# Patient Record
Sex: FEMALE | Race: WHITE | NOT HISPANIC OR LATINO | Employment: STUDENT | URBAN - METROPOLITAN AREA
[De-identification: names, ages, dates, MRNs, and addresses within clinical notes are randomized per-mention and may not be internally consistent; named-entity substitution may affect disease eponyms.]

---
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
## 2019-05-09 ENCOUNTER — OFFICE VISIT (OUTPATIENT)
Dept: RETAIL CLINIC | Facility: CLINIC | Age: 19
End: 2019-05-09

## 2019-05-09 VITALS
TEMPERATURE: 98.6 F | OXYGEN SATURATION: 99 % | DIASTOLIC BLOOD PRESSURE: 83 MMHG | SYSTOLIC BLOOD PRESSURE: 104 MMHG | HEART RATE: 71 BPM

## 2019-05-09 DIAGNOSIS — H66.002 ACUTE SUPPURATIVE OTITIS MEDIA OF LEFT EAR WITHOUT SPONTANEOUS RUPTURE OF TYMPANIC MEMBRANE, RECURRENCE NOT SPECIFIED: Primary | ICD-10-CM

## 2019-05-09 PROCEDURE — 99213 OFFICE O/P EST LOW 20 MIN: CPT | Performed by: NURSE PRACTITIONER

## 2019-05-09 RX ORDER — NORGESTIMATE AND ETHINYL ESTRADIOL 7DAYSX3 28
KIT ORAL
COMMUNITY

## 2019-05-09 RX ORDER — CEFDINIR 300 MG/1
300 CAPSULE ORAL 2 TIMES DAILY
Qty: 20 CAPSULE | Refills: 0 | Status: SHIPPED | OUTPATIENT
Start: 2019-05-09 | End: 2019-05-19

## 2019-05-09 RX ORDER — RANITIDINE 150 MG/1
150 TABLET ORAL
COMMUNITY
Start: 2019-04-11 | End: 2019-07-24 | Stop reason: SDUPTHER

## 2019-05-09 NOTE — PROGRESS NOTES
Subjective     Liza Casillas is a 18 y.o.. female.     Pt dx with mono. One week ago; last fever was on Thursday      Earache    There is pain in the left ear. This is a new problem. The current episode started today. The problem has been unchanged. Associated symptoms include abdominal pain, coughing (since January) and headaches. Pertinent negatives include no diarrhea, rhinorrhea, sore throat or vomiting. She has tried nothing for the symptoms. The treatment provided no relief.       The following portions of the patient's history were reviewed and updated as appropriate: allergies, current medications, past medical history, past social history and past surgical history.    Review of Systems   Constitutional: Negative for fever.   HENT: Positive for congestion and ear pain (left). Negative for rhinorrhea and sore throat.    Respiratory: Positive for cough (since January).    Gastrointestinal: Positive for abdominal pain and nausea. Negative for diarrhea and vomiting.   Neurological: Positive for headaches.       Objective     Vitals:    05/09/19 1136   BP: 104/83   Pulse: 71   Temp: 98.6 °F (37 °C)   TempSrc: Oral   SpO2: 99%       Physical Exam   Constitutional: She is oriented to person, place, and time. She appears well-developed and well-nourished.   HENT:   Head: Normocephalic and atraumatic.   Right Ear: Tympanic membrane is not erythematous. A middle ear effusion (clear) is present.   Left Ear: Tympanic membrane is erythematous. A middle ear effusion (clear with beginning haziness to left half of TM) is present.   Nose: Nose normal. Right sinus exhibits no maxillary sinus tenderness and no frontal sinus tenderness. Left sinus exhibits no maxillary sinus tenderness and no frontal sinus tenderness.   Mouth/Throat: Oropharynx is clear and moist. Oropharyngeal exudate: pnd.   Eyes: Conjunctivae are normal. Pupils are equal, round, and reactive to light.   Cardiovascular: Normal rate and regular rhythm.   No  murmur heard.  Pulmonary/Chest: Effort normal. She has no wheezes. She has no rhonchi. She has no rales.   Musculoskeletal: Normal range of motion.   Lymphadenopathy:     She has no cervical adenopathy.   Neurological: She is alert and oriented to person, place, and time.   Skin: Skin is warm and dry.   Vitals reviewed.      Assessment/Plan   Liza was seen today for earache.    Diagnoses and all orders for this visit:    Acute suppurative otitis media of left ear without spontaneous rupture of tympanic membrane, recurrence not specified  -     cefdinir (OMNICEF) 300 MG capsule; Take 1 capsule by mouth 2 (Two) Times a Day for 10 days.        Patient Instructions   Otitis Media, Adult  Otitis media occurs when there is inflammation and fluid in the middle ear. Your middle ear is a part of the ear that contains bones for hearing as well as air that helps send sounds to your brain.  What are the causes?  This condition is caused by a blockage in the eustachian tube. This tube drains fluid from the ear to the back of the nose (nasopharynx). A blockage in this tube can be caused by an object or by swelling (edema) in the tube. Problems that can cause a blockage include:  · A cold or other upper respiratory infection.  · Allergies.  · An irritant, such as tobacco smoke.  · Enlarged adenoids. The adenoids are areas of soft tissue located high in the back of the throat, behind the nose and the roof of the mouth.  · A mass in the nasopharynx.  · Damage to the ear caused by pressure changes (barotrauma).    What are the signs or symptoms?  Symptoms of this condition include:  · Ear pain.  · A fever.  · Decreased hearing.  · A headache.  · Tiredness (lethargy).  · Fluid leaking from the ear.  · Ringing in the ear.    How is this diagnosed?  This condition is diagnosed with a physical exam. During the exam your health care provider will use an instrument called an otoscope to look into your ear and check for redness, swelling,  and fluid. He or she will also ask about your symptoms.  Your health care provider may also order tests, such as:  · A test to check the movement of the eardrum (pneumatic otoscopy). This test is done by squeezing a small amount of air into the ear.  · A test that changes air pressure in the middle ear to check how well the eardrum moves and whether the eustachian tube is working (tympanogram).    How is this treated?  This condition usually goes away on its own within 3-5 days. But if the condition is caused by a bacteria infection and does not go away own its own, or keeps coming back, your health care provider may:  · Prescribe antibiotic medicines to treat the infection.  · Prescribe or recommend medicines to control pain.    Follow these instructions at home:  · Take over-the-counter and prescription medicines only as told by your health care provider.  · If you were prescribed an antibiotic medicine, take it as told by your health care provider. Do not stop taking the antibiotic even if you start to feel better.  · Keep all follow-up visits as told by your health care provider. This is important.  Contact a health care provider if:  · You have bleeding from your nose.  · There is a lump on your neck.  · You are not getting better in 5 days.  · You feel worse instead of better.  Get help right away if:  · You have severe pain that is not controlled with medicine.  · You have swelling, redness, or pain around your ear.  · You have stiffness in your neck.  · A part of your face is paralyzed.  · The bone behind your ear (mastoid) is tender when you touch it.  · You develop a severe headache.  Summary  · Otitis media is redness, soreness, and swelling of the middle ear.  · This condition usually goes away on its own within 3-5 days.  · If the problem does not go away in 3-5 days, your health care provider may prescribe or recommend medicines to treat your symptoms.  · If you were prescribed an antibiotic medicine,  take it as told by your health care provider.  This information is not intended to replace advice given to you by your health care provider. Make sure you discuss any questions you have with your health care provider.  Document Released: 09/22/2005 Document Revised: 12/08/2017 Document Reviewed: 12/08/2017  [employer] Interactive Patient Education © 2019 [employer]        Return if symptoms worsen or fail to improve, for follow up with primary care provider as needed.

## 2019-05-09 NOTE — PATIENT INSTRUCTIONS

## 2019-06-03 ENCOUNTER — OFFICE VISIT (OUTPATIENT)
Dept: INTERNAL MEDICINE | Facility: CLINIC | Age: 19
End: 2019-06-03

## 2019-06-03 VITALS
DIASTOLIC BLOOD PRESSURE: 60 MMHG | RESPIRATION RATE: 16 BRPM | HEART RATE: 64 BPM | WEIGHT: 147 LBS | SYSTOLIC BLOOD PRESSURE: 90 MMHG | OXYGEN SATURATION: 97 % | BODY MASS INDEX: 22.28 KG/M2 | HEIGHT: 68 IN | TEMPERATURE: 99.1 F

## 2019-06-03 DIAGNOSIS — K29.50 CHRONIC GASTRITIS WITHOUT BLEEDING, UNSPECIFIED GASTRITIS TYPE: Primary | ICD-10-CM

## 2019-06-03 DIAGNOSIS — Z23 NEED FOR TDAP VACCINATION: ICD-10-CM

## 2019-06-03 PROCEDURE — 99213 OFFICE O/P EST LOW 20 MIN: CPT | Performed by: FAMILY MEDICINE

## 2019-06-03 PROCEDURE — 90715 TDAP VACCINE 7 YRS/> IM: CPT | Performed by: FAMILY MEDICINE

## 2019-06-03 PROCEDURE — 90471 IMMUNIZATION ADMIN: CPT | Performed by: FAMILY MEDICINE

## 2019-06-03 NOTE — PROGRESS NOTES
Liza Casillas is a 18 y.o. female, who presents with a chief complaint of   Chief Complaint   Patient presents with   • Nausea     Has gastritis   • Establish Care     [facility] prior-thinks just due for TDAP       HPI     Pt presents to establish care.    1. Gastritis.  She had chronic nausea and vomiting and was diagnosed with gastritis and started on ranitidine 4 months ago.  She is feeling much better.  She takes occasional ondansetron as needed.      The following portions of the patient's history were reviewed and updated as appropriate: allergies, current medications, past family history, past medical history, past social history, past surgical history and problem list.    Allergies: Sulfa antibiotics    Review of Systems   Constitutional: Negative.    HENT: Negative.    Eyes: Negative.    Respiratory: Negative.    Cardiovascular: Negative.    Gastrointestinal: Positive for nausea.   Endocrine: Negative.    Genitourinary: Negative.    Musculoskeletal: Negative.    Skin: Negative.    Allergic/Immunologic: Negative.    Neurological: Negative.    Hematological: Negative.    Psychiatric/Behavioral: Negative.              Wt Readings from Last 3 Encounters:   06/03/19 66.7 kg (147 lb) (78 %, Z= 0.76)*   03/26/17 66.7 kg (147 lb) (83 %, Z= 0.96)*   10/23/16 64.9 kg (143 lb) (81 %, Z= 0.87)*     * Growth percentiles are based on CDC (Girls, 2-20 Years) data.     Temp Readings from Last 3 Encounters:   06/03/19 99.1 °F (37.3 °C) (Oral)   05/09/19 98.6 °F (37 °C) (Oral)   03/26/17 99.3 °F (37.4 °C)     BP Readings from Last 3 Encounters:   06/03/19 90/60   05/09/19 104/83   03/26/17 108/64     Pulse Readings from Last 3 Encounters:   06/03/19 64   05/09/19 71   03/26/17 (!) 132     Body mass index is 22.35 kg/m².  @LASTSAO2(3)@    Physical Exam   Constitutional: She is oriented to person, place, and time. She appears well-developed and well-nourished. No distress.   HENT:   Head: Normocephalic and  atraumatic.   Eyes: Conjunctivae and EOM are normal.   Neck: Neck supple. No thyromegaly present.   Cardiovascular: Normal rate, regular rhythm and normal heart sounds.   Pulmonary/Chest: Effort normal and breath sounds normal.   Abdominal: Soft. She exhibits no distension. There is no tenderness.   Musculoskeletal: Normal range of motion. She exhibits no edema.   Neurological: She is alert and oriented to person, place, and time.   Skin: Skin is warm and dry.   Psychiatric: She has a normal mood and affect. Her behavior is normal.   Nursing note and vitals reviewed.          Liza was seen today for nausea and establish care.    Diagnoses and all orders for this visit:    Chronic gastritis without bleeding, unspecified gastritis type    Well-controlled with ranitidine and ondansetron.    Tdap today.      Outpatient Medications Prior to Visit   Medication Sig Dispense Refill   • norgestimate-ethinyl estradiol (ORTHO TRI-CYCLEN,TRINESSA) 0.18/0.215/0.25 MG-35 MCG per tablet Take  by mouth.     • ondansetron (ZOFRAN) 4 MG tablet Take 1 tablet by mouth Every 12 (Twelve) Hours As Needed for Nausea or Vomiting. 10 tablet 0   • Probiotic Product (PROBIOTIC PO) Take  by mouth.     • raNITIdine (ZANTAC) 150 MG tablet Take 150 mg by mouth.     • ISOtretinoin (ACCUTANE PO) Take  by mouth.     • NON FORMULARY Take 1 tablet by mouth Daily. Birth control pill       No facility-administered medications prior to visit.      No orders of the defined types were placed in this encounter.    [unfilled]  Medications Discontinued During This Encounter   Medication Reason   • NON FORMULARY *Error   • ISOtretinoin (ACCUTANE PO) *Therapy completed         Return if symptoms worsen or fail to improve.

## 2019-07-24 RX ORDER — RANITIDINE 150 MG/1
150 TABLET ORAL DAILY
Qty: 90 TABLET | Refills: 1 | Status: SHIPPED | OUTPATIENT
Start: 2019-07-24 | End: 2020-01-08

## 2019-07-24 RX ORDER — ONDANSETRON 4 MG/1
4 TABLET, FILM COATED ORAL EVERY 12 HOURS PRN
Qty: 30 TABLET | Refills: 5 | Status: SHIPPED | OUTPATIENT
Start: 2019-07-24 | End: 2019-11-06 | Stop reason: SDUPTHER

## 2019-08-19 ENCOUNTER — OFFICE VISIT (OUTPATIENT)
Dept: INTERNAL MEDICINE | Facility: CLINIC | Age: 19
End: 2019-08-19

## 2019-08-19 VITALS
RESPIRATION RATE: 16 BRPM | BODY MASS INDEX: 21.98 KG/M2 | TEMPERATURE: 99.3 F | OXYGEN SATURATION: 99 % | SYSTOLIC BLOOD PRESSURE: 114 MMHG | HEART RATE: 96 BPM | HEIGHT: 68 IN | WEIGHT: 145 LBS | DIASTOLIC BLOOD PRESSURE: 60 MMHG

## 2019-08-19 DIAGNOSIS — J30.9 ALLERGIC RHINITIS, UNSPECIFIED SEASONALITY, UNSPECIFIED TRIGGER: Primary | ICD-10-CM

## 2019-08-19 DIAGNOSIS — H65.93 MIDDLE EAR EFFUSION, BILATERAL: ICD-10-CM

## 2019-08-19 PROCEDURE — 99213 OFFICE O/P EST LOW 20 MIN: CPT | Performed by: FAMILY MEDICINE

## 2019-08-19 NOTE — PROGRESS NOTES
Liza Casillas is a 18 y.o. female, who presents with a chief complaint of   Chief Complaint   Patient presents with   • Otitis Media     LT        HPI     Pt presents with the complaint of decreased hearing and fullness in the left ear.  This has been worsening for several days.  She also has mild symptoms on the right.  She has a history of acute left otitis media 3 months ago and ruptured left TM as a child.  She also c/o nasal congestion, itching, sneezing runny nose for several days.  She denies ear pain, fever, sinus pain, cough.    The following portions of the patient's history were reviewed and updated as appropriate: allergies, current medications, past family history, past medical history, past social history, past surgical history and problem list.    Allergies: Sulfa antibiotics    Review of Systems   Constitutional: Negative for appetite change and fever.   HENT: Positive for congestion, hearing loss (left), postnasal drip, rhinorrhea and sneezing. Negative for ear pain, sinus pressure and sinus pain.    Respiratory: Negative for shortness of breath.    Cardiovascular: Negative.    Allergic/Immunologic: Positive for environmental allergies.             Wt Readings from Last 3 Encounters:   08/19/19 65.8 kg (145 lb) (75 %, Z= 0.68)*   06/03/19 66.7 kg (147 lb) (78 %, Z= 0.76)*   03/26/17 66.7 kg (147 lb) (83 %, Z= 0.96)*     * Growth percentiles are based on CDC (Girls, 2-20 Years) data.     Temp Readings from Last 3 Encounters:   08/19/19 99.3 °F (37.4 °C) (Oral)   06/03/19 99.1 °F (37.3 °C) (Oral)   05/09/19 98.6 °F (37 °C) (Oral)     BP Readings from Last 3 Encounters:   08/19/19 114/60   06/03/19 90/60   05/09/19 104/83     Pulse Readings from Last 3 Encounters:   08/19/19 96   06/03/19 64   05/09/19 71     Body mass index is 22.05 kg/m².  @LASTSAO2(3)@    Physical Exam   Constitutional: She is oriented to person, place, and time. She appears well-developed and well-nourished. No distress.    HENT:   Head: Normocephalic and atraumatic.   Right Ear: A middle ear effusion is present.   Left Ear: A middle ear effusion is present.   Nose: Mucosal edema present.   Mouth/Throat: Oropharynx is clear and moist.   Eyes: Conjunctivae and EOM are normal.   Neck: Neck supple. No thyromegaly present.   Pulmonary/Chest: Effort normal. No respiratory distress.   Musculoskeletal: Normal range of motion. She exhibits no edema.   Neurological: She is alert and oriented to person, place, and time.   Skin: Skin is warm and dry.   Psychiatric: She has a normal mood and affect. Her behavior is normal.   Nursing note and vitals reviewed.            Liza was seen today for otitis media.    Diagnoses and all orders for this visit:    Allergic rhinitis, unspecified seasonality, unspecified trigger    Middle ear effusion, bilateral      Start nasal steroid and antihistamine.  Anticipatory guidance given.        Outpatient Medications Prior to Visit   Medication Sig Dispense Refill   • norgestimate-ethinyl estradiol (ORTHO TRI-CYCLEN,TRINESSA) 0.18/0.215/0.25 MG-35 MCG per tablet Take  by mouth.     • ondansetron (ZOFRAN) 4 MG tablet Take 1 tablet by mouth Every 12 (Twelve) Hours As Needed for Nausea or Vomiting. 30 tablet 5   • Probiotic Product (PROBIOTIC PO) Take  by mouth.     • raNITIdine (ZANTAC) 150 MG tablet Take 1 tablet by mouth Daily. 90 tablet 1     No facility-administered medications prior to visit.      No orders of the defined types were placed in this encounter.    [unfilled]  There are no discontinued medications.      No Follow-up on file.

## 2019-11-04 ENCOUNTER — TELEPHONE (OUTPATIENT)
Dept: INTERNAL MEDICINE | Facility: CLINIC | Age: 19
End: 2019-11-04

## 2019-11-04 NOTE — TELEPHONE ENCOUNTER
PATIENT CALLED NEEDING A REFILL ON ZOFRAN. SHE ALSO QUESTIONED USING RANITIDINE, 150 MG. THERE HAS BEEN ARTICLES ABOUT IT POSSIBLE BEING  CANCER CAUSING. WHAT DOES DR. MCCLAIN THINK?      [facility] [place] [place], [address] [facility].

## 2019-11-06 RX ORDER — ONDANSETRON 4 MG/1
4 TABLET, FILM COATED ORAL EVERY 12 HOURS PRN
Qty: 30 TABLET | Refills: 5 | Status: SHIPPED | OUTPATIENT
Start: 2019-11-06 | End: 2020-10-08 | Stop reason: SDUPTHER

## 2019-11-06 NOTE — TELEPHONE ENCOUNTER
Okay to refill Zofran.  She could switch from ranitidine to either famotidine (Pepcid) or cimetidine (Tagamet) over-the-counter.

## 2020-01-08 ENCOUNTER — OFFICE VISIT (OUTPATIENT)
Dept: INTERNAL MEDICINE | Facility: CLINIC | Age: 20
End: 2020-01-08

## 2020-01-08 VITALS
RESPIRATION RATE: 16 BRPM | HEIGHT: 68 IN | BODY MASS INDEX: 21.37 KG/M2 | DIASTOLIC BLOOD PRESSURE: 68 MMHG | HEART RATE: 81 BPM | TEMPERATURE: 98.5 F | SYSTOLIC BLOOD PRESSURE: 110 MMHG | OXYGEN SATURATION: 98 % | WEIGHT: 141 LBS

## 2020-01-08 DIAGNOSIS — R11.0 CHRONIC NAUSEA: ICD-10-CM

## 2020-01-08 DIAGNOSIS — M54.2 NECK PAIN: Primary | ICD-10-CM

## 2020-01-08 PROCEDURE — 99213 OFFICE O/P EST LOW 20 MIN: CPT | Performed by: FAMILY MEDICINE

## 2020-01-08 RX ORDER — TIZANIDINE 4 MG/1
4 TABLET ORAL NIGHTLY PRN
Qty: 30 TABLET | Refills: 0 | Status: ON HOLD | OUTPATIENT
Start: 2020-01-08 | End: 2020-08-03

## 2020-01-08 RX ORDER — MELOXICAM 15 MG/1
15 TABLET ORAL DAILY
Qty: 30 TABLET | Refills: 0 | Status: ON HOLD | OUTPATIENT
Start: 2020-01-08 | End: 2020-08-03

## 2020-01-08 NOTE — PROGRESS NOTES
Liza Casillas is a 19 y.o. female, who presents with a chief complaint of   Chief Complaint   Patient presents with   • Neck Pain     recurrent since 10/15/19-right sided       HPI     Pt presents with intermittent right-sided neck pain X 3 months.  She woke up one day in October with a neck spasm.  She went to urgent care at the onset and was prescribed muscle relaxants.  She says it feels irritated and stiff off and on since then.  Denies shooting pains down her arm.  Denies numbness/tingling.    Chronic nausea X 5 years.  Intermittent.  She hasn't had a workup done and would like to see a gastroenterologist.  Ondansetron and ranitidine help.    The following portions of the patient's history were reviewed and updated as appropriate: allergies, current medications, past family history, past medical history, past social history, past surgical history and problem list.    Allergies: Sulfa antibiotics    Review of Systems   Constitutional: Negative for fever.   Gastrointestinal: Positive for nausea and vomiting. Negative for abdominal pain.   Musculoskeletal: Positive for neck pain.   Skin: Negative.    Psychiatric/Behavioral: Negative for sleep disturbance.             Wt Readings from Last 3 Encounters:   01/08/20 64 kg (141 lb)   08/19/19 65.8 kg (145 lb) (75 %, Z= 0.68)*   06/03/19 66.7 kg (147 lb) (78 %, Z= 0.76)*     * Growth percentiles are based on CDC (Girls, 2-20 Years) data.     Temp Readings from Last 3 Encounters:   01/08/20 98.5 °F (36.9 °C) (Oral)   08/19/19 99.3 °F (37.4 °C) (Oral)   06/03/19 99.1 °F (37.3 °C) (Oral)     BP Readings from Last 3 Encounters:   01/08/20 110/68   08/19/19 114/60   06/03/19 90/60     Pulse Readings from Last 3 Encounters:   01/08/20 81   08/19/19 96   06/03/19 64     Body mass index is 21.44 kg/m².  @LASTSAO2(3)@    Physical Exam   Constitutional: She is oriented to person, place, and time. She appears well-developed and well-nourished. No distress.   HENT:   Head:  "Normocephalic and atraumatic.   Eyes: Conjunctivae and EOM are normal.   Pulmonary/Chest: Effort normal. No respiratory distress.   Abdominal: Soft. There is no tenderness.   Musculoskeletal:        Cervical back: She exhibits tenderness (mild right muscular). She exhibits normal range of motion, no bony tenderness, no swelling, no deformity, no pain and no spasm.   Neurological: She is alert and oriented to person, place, and time. She displays normal reflexes. She exhibits normal muscle tone. Coordination normal.   Skin: Skin is warm and dry.   Psychiatric: She has a normal mood and affect. Her behavior is normal.   Nursing note and vitals reviewed.      Results for orders placed or performed in visit on 03/26/17   POC Influenza A / B   Result Value Ref Range    Rapid Influenza A Ag positive     Rapid Influenza B Ag negative     Internal Control Passed Passed    Lot Number [id number]     Expiration Date 1,[id number]            Liza was seen today for neck pain.    Diagnoses and all orders for this visit:    Neck pain  -     tiZANidine (ZANAFLEX) 4 MG tablet; Take 1 tablet by mouth At Night As Needed for Muscle Spasms.  -     meloxicam (MOBIC) 15 MG tablet; Take 1 tablet by mouth Daily. Take with food.  -     Ambulatory Referral to Physical Therapy Evaluate and treat    Chronic nausea  -     Ambulatory Referral to Gastroenterology  -     H. Pylori Breath Test - Breath, Lung      No \"red flag\" s/sxs.  Physical therapy.  Nsaid and tizanidine.    Outpatient Medications Prior to Visit   Medication Sig Dispense Refill   • norgestimate-ethinyl estradiol (ORTHO TRI-CYCLEN,TRINESSA) 0.18/0.215/0.25 MG-35 MCG per tablet Take  by mouth.     • ondansetron (ZOFRAN) 4 MG tablet Take 1 tablet by mouth Every 12 (Twelve) Hours As Needed for Nausea or Vomiting. 30 tablet 5   • Probiotic Product (PROBIOTIC PO) Take  by mouth.     • raNITIdine (ZANTAC) 150 MG tablet Take 1 tablet by mouth Daily. 90 tablet 1     No facility-administered " medications prior to visit.      New Medications Ordered This Visit   Medications   • tiZANidine (ZANAFLEX) 4 MG tablet     Sig: Take 1 tablet by mouth At Night As Needed for Muscle Spasms.     Dispense:  30 tablet     Refill:  0   • meloxicam (MOBIC) 15 MG tablet     Sig: Take 1 tablet by mouth Daily. Take with food.     Dispense:  30 tablet     Refill:  0     [unfilled]  Medications Discontinued During This Encounter   Medication Reason   • raNITIdine (ZANTAC) 150 MG tablet *Therapy completed         No follow-ups on file.

## 2020-01-09 LAB — UREA BREATH TEST QL: NEGATIVE

## 2020-01-17 ENCOUNTER — TELEPHONE (OUTPATIENT)
Dept: INTERNAL MEDICINE | Facility: CLINIC | Age: 20
End: 2020-01-17

## 2020-01-17 NOTE — TELEPHONE ENCOUNTER
----- Message from Niko Barrow MD sent at 1/9/2020  5:01 PM EST -----  Please call the patient regarding her result.  H. Pylori test is negative.

## 2020-01-20 ENCOUNTER — HOSPITAL ENCOUNTER (OUTPATIENT)
Dept: PHYSICAL THERAPY | Facility: HOSPITAL | Age: 20
Setting detail: THERAPIES SERIES
Discharge: HOME | End: 2020-01-20

## 2020-01-20 DIAGNOSIS — M54.2 NECK PAIN: Primary | ICD-10-CM

## 2020-01-20 PROCEDURE — 97161 PT EVAL LOW COMPLEX 20 MIN: CPT

## 2020-01-20 NOTE — THERAPY EVALUATION
Outpatient Physical Therapy Ortho Initial Evaluation  [facility]     Patient Name: Liza Casillas  : 2000  MRN: [id number]  Today's Date: 2020      Visit Date: 2020    Patient Active Problem List   Diagnosis   • Neck pain   • Chronic nausea        Past Medical History:   Diagnosis Date   • Gastritis         Past Surgical History:   Procedure Laterality Date   • WISDOM TOOTH EXTRACTION         Visit Dx:     ICD-10-CM ICD-9-CM   1. Neck pain M54.2 723.1         Patient History     Row Name 20 1200             History    Chief Complaint  Muscle tenderness;Muscle weakness;Pain  -AS      Type of Pain  Neck pain  -AS      Brief Description of Current Complaint  Patient states she first started having neck pain in 2019. She states she went to her MD and was given muscle relaxers and felt good for 1 month. She states her pain did return but is now more of a soreness type pain then muscle tightness. She denies numbness or tingling and states her pain is all on the right side of her neck to her right shoulder. She denies having x-rays or MRI at this time. She states she is a  at her college and this tends to increase her pain.  -AS      Patient/Caregiver Goals  Relieve pain;Return to prior level of function  -AS      Patient's Rating of General Health  Excellent  -AS      Hand Dominance  right-handed  -AS      Occupation/sports/leisure activities  Student,   -AS      Patient seeing anyone else for problem(s)?  Dr. Barrow  -AS      How has patient tried to help current problem?  rest, stretches, heat, pain meds  -AS         Pain     Pain Location  Neck  -AS      Pain at Present  4  -AS      Pain at Best  0  -AS      Pain at Worst  6  -AS      Pain Frequency  Intermittent  -AS      Pain Description  Aching  -AS      What Performance Factors Make the Current Problem(s) WORSE?  turning head to right, use of RUE  -AS      What Performance Factors  Make the Current Problem(s) BETTER?  rest  -AS         Daily Activities    Primary Language  English  -AS      Are you able to read  Yes  -AS      Are you able to write  Yes  -AS      How does patient learn best?  Listening;Reading  -AS      Teaching needs identified  Home Exercise Program;Management of Condition  -AS      Patient is concerned about/has problems with  Flexibility;Performing job responsibilities/community activities (work, school,;Performing sports, recreation, and play activities;Reaching over head;Repetitive movements of the hand, arm, shoulder  -AS      Does patient have problems with the following?  Anxiety  -AS      Barriers to learning  None  -AS      Pt Participated in POC and Goals  Yes  -AS         Safety    Are you being hurt, hit, or frightened by anyone at home or in your life?  No  -AS      Are you being neglected by a caregiver  No  -AS        User Key  (r) = Recorded By, (t) = Taken By, (c) = Cosigned By    Initials Name Provider Type    AS Shashank Miller, PT Physical Therapist          PT Ortho     Row Name 01/20/20 1200       Precautions and Contraindications    Precautions/Limitations  no known precautions/limitations  -AS       Subjective Pain    Able to rate subjective pain?  yes  -AS    Pre-Treatment Pain Level  4  -AS    Post-Treatment Pain Level  3  -AS       Posture/Observations    Posture- WNL  Posture is WNL  -AS       Cervical Palpation    Levator Scapula  Right:;Tender;Guarded/taut  -AS    Upper Traps  Right:;Tender;Guarded/taut  -AS       Head/Neck/Trunk    Neck Extension AROM  45  -AS    Neck Flexion AROM  43  -AS    Neck Lt Lateral Flexion AROM  33  -AS    Neck Rt Lateral Flexion AROM  35  -AS    Neck Lt Rotation AROM  70  -AS    Neck Rt Rotation AROM  65  -AS       MMT Neck/Trunk    Neck Flexion MMT, Gross Movement  (4/5) good  -AS    Neck Extension MMT, Gross Movement  (4/5) good  -AS       MMT Right Upper Ext    Rt Shoulder Flexion MMT, Gross Movement  (4/5)  good  -AS    Rt Shoulder ABduction MMT, Gross Movement  (4/5) good  -AS       Sensation    Sensation WNL?  WNL  -AS    Light Touch  No apparent deficits  -AS       Upper Extremity Flexibility    Upper Trapezius  Right:;Mildly limited  -AS    Levator Scapula  Right:;Mildly limited  -AS      User Key  (r) = Recorded By, (t) = Taken By, (c) = Cosigned By    Initials Name Provider Type    AS Shashank Miller, PT Physical Therapist                      Therapy Education  Given: HEP, Symptoms/condition management, Pain management  Program: New  How Provided: Verbal, Demonstration, Written  Provided to: Patient  Level of Understanding: Teach back education performed, Verbalized, Demonstrated     PT OP Goals     Row Name 01/20/20 1200          PT Short Term Goals    STG Date to Achieve  02/10/20  -AS     STG 1  Patient to demonstrate compliance with her initial HEP.  -AS     STG 2  Patient to report her right sided neck pain on VAS of 4-5/10 at worst with activity.  -AS        Long Term Goals    LTG Date to Achieve  03/02/20  -AS     LTG 1  Patient to demonstrate compliance with her advanced HEP.  -AS     LTG 2  Patient to report her right sided neck pain on VAS of 0-1/10 at worst with activity.  -AS     LTG 3  Patient to demonstrate improved cervical ROM to WNL in all planes.  -AS        Time Calculation    PT Goal Re-Cert Due Date  02/17/20  -AS       User Key  (r) = Recorded By, (t) = Taken By, (c) = Cosigned By    Initials Name Provider Type    AS Shashank Miller, PT Physical Therapist          PT Assessment/Plan     Row Name 01/20/20 1200          PT Assessment    Functional Limitations  Performance in sport activities;Performance in work activities  -AS     Impairments  Impaired flexibility;Muscle strength;Pain;Range of motion  -AS     Assessment Comments  Patient reports to outpatient PT with complaints of right sided neck pain. Patient has limited neck ROM, limited neck and RUE strength, and increased  pain and discomfort with activity. Patient has limited function secondary to the above.  -AS     Please refer to paper survey for additional self-reported information  Yes  -AS     Rehab Potential  Good  -AS     Patient/caregiver participated in establishment of treatment plan and goals  Yes  -AS     Patient would benefit from skilled therapy intervention  Yes  -AS        PT Plan    PT Frequency  2x/week  -AS     Predicted Duration of Therapy Intervention (Therapy Eval)  2-4 weeks  -AS     Planned CPT's?  PT RE-EVAL: 26451;PT THER PROC EA 15 MIN: 78874;PT THER ACT EA 15 MIN: 19527;PT MANUAL THERAPY EA 15 MIN: 82232;PT NEUROMUSC RE-EDUCATION EA 15 MIN: 37231;PT ELECTRICAL STIM UNATTEND: ;PT ULTRASOUND EA 15 MIN: 49903;PT HOT/COLD PACK WC NONMCARE: 95955  -AS       User Key  (r) = Recorded By, (t) = Taken By, (c) = Cosigned By    Initials Name Provider Type    AS Shashank Miller, PT Physical Therapist          Modalities     Row Name 01/20/20 1200             Moist Heat    MH Applied  Yes  -AS      Location  Neck - Pt Supine  -AS      Rx Minutes  10 mins  -AS      MH Prior to Rx  Yes  -AS        User Key  (r) = Recorded By, (t) = Taken By, (c) = Cosigned By    Initials Name Provider Type    AS Shashank Miller, PT Physical Therapist        OP Exercises     Row Name 01/20/20 1200             Subjective Pain    Able to rate subjective pain?  yes  -AS      Pre-Treatment Pain Level  4  -AS      Post-Treatment Pain Level  3  -AS         Exercise 1    Exercise Name 1  R UT Stretch  -AS      Reps 1  10  -AS      Time 1  10 sec hold each  -AS         Exercise 2    Exercise Name 2  R Levator Stretch  -AS      Reps 2  10  -AS      Time 2  10 sec hold each  -AS        User Key  (r) = Recorded By, (t) = Taken By, (c) = Cosigned By    Initials Name Provider Type    AS Shashank Miller, PT Physical Therapist           Manual Rx (last 36 hours)      Manual Treatments     Row Name 01/20/20 1200             Manual  Rx 1    Manual Rx 1 Location  Neck  -AS      Manual Rx 1 Type  PROM, Stretching  -AS      Manual Rx 1 Duration  15 min  -AS        User Key  (r) = Recorded By, (t) = Taken By, (c) = Cosigned By    Initials Name Provider Type    AS Shashank Miller, PT Physical Therapist                      Outcome Measure Options: Neck Disability Index (NDI)  Neck Disability Index  Section 1 - Pain Intensity: The pain is moderate at the moment.  Section 2 - Personal Care: It is painful to look after myself, and I am slow and careful.  Section 3 - Lifting: Pain prevents me from lifting heavy weights off the floor but I can manage if items are conveniently positioned, ie. on a table.  Section 4 - Work: I can do most of my usual work, but no more  Section 5 - Headaches: I have no headaches at all.  Section 6 - Concentration: I can concentrate fully with slight difficulty.  Section 7 - Sleeping: My sleep is mildly disturbed for up to 1-2 hours.  Section 8 - Driving: I can drive as long as I want with slight neck pain.  Section 9 - Reading: I can read as much as I want with slight neck pain.  Section 10 - Recreation: I have some neck pain with all recreational activities.  Neck Disability Index Score: 14      Time Calculation:     Start Time: 0930  Stop Time: 1018  Time Calculation (min): 48 min     Therapy Charges for Today     Code Description Service Date Service Provider Modifiers Qty    [id number]  PT EVAL LOW COMPLEXITY 3 1/20/2020 Shashank Miller, PT GP 1          PT G-Codes  Outcome Measure Options: Neck Disability Index (NDI)  Neck Disability Index Score: 14         Shashank Miller, PT  1/20/2020

## 2020-05-08 ENCOUNTER — TELEPHONE (OUTPATIENT)
Dept: INTERNAL MEDICINE | Facility: CLINIC | Age: 20
End: 2020-05-08

## 2020-05-08 NOTE — TELEPHONE ENCOUNTER
"Patients mom called stating patient has the stomach flu again for the 2nd time this year, states she has been having constant stomach trouble with pain, nausea, \"inflamed stomach\". Has been on zofran, which helps but family wants to come up with a solution to the stomach issues, wants to know if allergy testing, scope etc is an option, also wants to know if a consult to a GI doctor will help. Please advise what next step should be and return to nurse pool.  "

## 2020-05-09 NOTE — TELEPHONE ENCOUNTER
I actually referred her to gastroenterology back in January.  So yes, I think that is the next step (for endoscopy, etc.).  I doesn't look like she has done it yet but the referral should still be good. No

## 2020-05-11 NOTE — TELEPHONE ENCOUNTER
Spoke with pt told her the referral still stands and she needs to see GI--she had told them she would call them back to [facility]--I gave her the # to make sure she had it--she is to [facility] appt with GI since she still feel bad and her mother is concerned

## 2020-06-01 ENCOUNTER — TELEPHONE (OUTPATIENT)
Dept: GASTROENTEROLOGY | Facility: CLINIC | Age: 20
End: 2020-06-01

## 2020-06-01 NOTE — TELEPHONE ENCOUNTER
FAST TRACK (IN [facility]) - REFERRED BY DR. MCCLAIN - WEIGHT LOSS, NAUSEA, VOMITING & ABD PAIN - SCHEDULE AT [place].

## 2020-06-08 NOTE — TELEPHONE ENCOUNTER
Sorry lets get her an Appt first, this is long standing therefore wont go away fast either, New Pt appt please

## 2020-06-09 NOTE — TELEPHONE ENCOUNTER
CALLED AND SPOKE WITH PATIENT.  SCHEDULED OFFICE VISIT FOR 07/23/2020 AT [facility].

## 2020-07-23 ENCOUNTER — OFFICE VISIT (OUTPATIENT)
Dept: GASTROENTEROLOGY | Facility: CLINIC | Age: 20
End: 2020-07-23

## 2020-07-23 VITALS — WEIGHT: 144.6 LBS | HEIGHT: 68 IN | BODY MASS INDEX: 21.92 KG/M2 | TEMPERATURE: 98.6 F

## 2020-07-23 DIAGNOSIS — R11.14 BILIOUS VOMITING WITH NAUSEA: Primary | ICD-10-CM

## 2020-07-23 DIAGNOSIS — R10.13 EPIGASTRIC PAIN: ICD-10-CM

## 2020-07-23 DIAGNOSIS — K21.9 GASTROESOPHAGEAL REFLUX DISEASE, ESOPHAGITIS PRESENCE NOT SPECIFIED: ICD-10-CM

## 2020-07-23 PROCEDURE — 99203 OFFICE O/P NEW LOW 30 MIN: CPT | Performed by: INTERNAL MEDICINE

## 2020-07-23 RX ORDER — OMEPRAZOLE 40 MG/1
40 CAPSULE, DELAYED RELEASE ORAL DAILY
Qty: 90 CAPSULE | Refills: 3 | Status: SHIPPED | OUTPATIENT
Start: 2020-07-23 | End: 2020-10-08 | Stop reason: SDUPTHER

## 2020-07-23 NOTE — PROGRESS NOTES
PATIENT INFORMATION  Liza Casillas       - 2000    CHIEF COMPLAINT  Chief Complaint   Patient presents with   • Abdominal Pain   • Nausea   • Vomiting       HISTORY OF PRESENT ILLNESS  Bowels are regular and daily and no pain assoc with.     HB everyday from any foods at least once is her best day, no nocturnal events unless she is really hungry.     Worst days will be there in the AM with cramping sweats in the epigastrum and cant even think of food. Can drink water and uses yogurt and uses zofran ( leads to constipation).     Goes back to her Sophomore year in High school and the GERD runs in family even her brother at 15 yo - Mom uses pepto/ Tums and antacids.     Her worse spells can be weekly to every month    H. Pylori breath test was negative.     Took Mobic fro a neck spasm and got thru it with PT          REVIEW OF SYSTEMS  Review of Systems   Constitutional: Positive for unexpected weight change.   Gastrointestinal: Positive for abdominal pain, nausea and vomiting.   All other systems reviewed and are negative.        ACTIVE PROBLEMS  Patient Active Problem List    Diagnosis   • Neck pain [M54.2]   • Chronic nausea [R11.0]         PAST MEDICAL HISTORY  Past Medical History:   Diagnosis Date   • Gastritis          SURGICAL HISTORY  Past Surgical History:   Procedure Laterality Date   • WISDOM TOOTH EXTRACTION           FAMILY HISTORY  Family History   Problem Relation Age of Onset   • Anxiety disorder Mother    • Anxiety disorder Maternal Grandfather    • Colon cancer Neg Hx    • Colon polyps Neg Hx    • Esophageal cancer Neg Hx    • Liver cancer Neg Hx    • Rectal cancer Neg Hx    • Stomach cancer Neg Hx          SOCIAL HISTORY  Social History     Occupational History   • Not on file   Tobacco Use   • Smoking status: Never Smoker   • Smokeless tobacco: Never Used   Substance and Sexual Activity   • Alcohol use: Yes     Comment: once weekly   • Drug use: No   • Sexual activity: Yes      "Partners: Male     Birth control/protection: OCP         CURRENT MEDICATIONS    Current Outpatient Medications:   •  meloxicam (MOBIC) 15 MG tablet, Take 1 tablet by mouth Daily. Take with food., Disp: 30 tablet, Rfl: 0  •  norgestimate-ethinyl estradiol (ORTHO TRI-CYCLEN,TRINESSA) 0.18/0.215/0.25 MG-35 MCG per tablet, Take  by mouth., Disp: , Rfl:   •  omeprazole (priLOSEC) 40 MG capsule, Take 1 capsule by mouth Daily., Disp: 90 capsule, Rfl: 3  •  ondansetron (ZOFRAN) 4 MG tablet, Take 1 tablet by mouth Every 12 (Twelve) Hours As Needed for Nausea or Vomiting., Disp: 30 tablet, Rfl: 5  •  Probiotic Product (PROBIOTIC PO), Take  by mouth., Disp: , Rfl:   •  tiZANidine (ZANAFLEX) 4 MG tablet, Take 1 tablet by mouth At Night As Needed for Muscle Spasms., Disp: 30 tablet, Rfl: 0    ALLERGIES  Sulfa antibiotics    VITALS  Vitals:    07/23/20 1455   Temp: 98.6 °F (37 °C)   TempSrc: Temporal   Weight: 65.6 kg (144 lb 9.6 oz)   Height: 172.7 cm (67.99\")       LAST RESULTS   Office Visit on 01/08/2020   Component Date Value Ref Range Status   • H. pylori Breath Test 01/08/2020 Negative  Negative Final     No results found.    PHYSICAL EXAM  Debilities/Disabilities Identified: None  Emotional Behavior: Appropriate  Wt Readings from Last 3 Encounters:   07/23/20 65.6 kg (144 lb 9.6 oz)   05/08/20 64.4 kg (142 lb)   01/08/20 64 kg (141 lb)     Ht Readings from Last 1 Encounters:   07/23/20 172.7 cm (67.99\")     Body mass index is 21.99 kg/m².  Physical Exam   Constitutional: She is oriented to person, place, and time. She appears well-developed and well-nourished.   Eyes: Pupils are equal, round, and reactive to light. Conjunctivae and EOM are normal. No scleral icterus.   Neck: Normal range of motion. Neck supple. No thyromegaly present.   Cardiovascular: Normal rate, regular rhythm, normal heart sounds and intact distal pulses. Exam reveals no gallop.   No murmur heard.  Pulmonary/Chest: Effort normal and breath sounds " normal. She has no wheezes. She has no rales.   Abdominal: Soft. Bowel sounds are normal. She exhibits no shifting dullness, no distension, no fluid wave, no abdominal bruit, no ascites and no mass. There is no hepatosplenomegaly. There is generalized tenderness and tenderness in the epigastric area and left upper quadrant. There is no guarding and negative Chavez's sign. Hernia confirmed negative in the ventral area.   Musculoskeletal: Normal range of motion. She exhibits no edema.   Lymphadenopathy:     She has no cervical adenopathy.   Neurological: She is alert and oriented to person, place, and time.   Skin: Skin is warm and dry. No rash noted. She is not diaphoretic. No erythema.       CLINICAL DATA REVIEWED   reviewed previous lab results and integrated with today's visit, reviewed notes from other physicians and/or last GI encounter, reviewed previous endoscopy results and available photos    ASSESSMENT  Diagnoses and all orders for this visit:    Bilious vomiting with nausea  -     Case Request; Standing  -     Case Request    Gastroesophageal reflux disease, esophagitis presence not specified    Epigastric pain    Other orders  -     Follow Anesthesia Guidelines / Protocol; Future  -     Obtain Informed Consent; Standing  -     omeprazole (priLOSEC) 40 MG capsule; Take 1 capsule by mouth Daily.          PLAN  Appears to be both GERD and Gastritis and has been exposed to NSAIDS but is also long standing so will start a PPI and schedule EGD    Return if symptoms worsen or fail to improve.    I have discussed the above plan with the patient.  They verbalize understanding and are in agreement with the plan.  They have been advised to contact the office for any questions, concerns, or changes related to their health.

## 2020-07-28 ENCOUNTER — TRANSCRIBE ORDERS (OUTPATIENT)
Dept: ADMINISTRATIVE | Facility: HOSPITAL | Age: 20
End: 2020-07-28

## 2020-07-28 DIAGNOSIS — Z01.818 OTHER SPECIFIED PRE-OPERATIVE EXAMINATION: Primary | ICD-10-CM

## 2020-07-31 ENCOUNTER — LAB (OUTPATIENT)
Dept: LAB | Facility: HOSPITAL | Age: 20
End: 2020-07-31

## 2020-07-31 ENCOUNTER — ANESTHESIA EVENT (OUTPATIENT)
Dept: PERIOP | Facility: HOSPITAL | Age: 20
End: 2020-07-31

## 2020-07-31 DIAGNOSIS — Z01.818 OTHER SPECIFIED PRE-OPERATIVE EXAMINATION: ICD-10-CM

## 2020-07-31 PROCEDURE — U0004 COV-19 TEST NON-CDC HGH THRU: HCPCS

## 2020-07-31 PROCEDURE — C9803 HOPD COVID-19 SPEC COLLECT: HCPCS

## 2020-07-31 PROCEDURE — U0002 COVID-19 LAB TEST NON-CDC: HCPCS

## 2020-08-02 LAB
REF LAB TEST METHOD: NORMAL
SARS-COV-2 RNA RESP QL NAA+PROBE: NOT DETECTED

## 2020-08-03 ENCOUNTER — ANESTHESIA (OUTPATIENT)
Dept: PERIOP | Facility: HOSPITAL | Age: 20
End: 2020-08-03

## 2020-08-03 ENCOUNTER — HOSPITAL ENCOUNTER (OUTPATIENT)
Facility: HOSPITAL | Age: 20
Setting detail: HOSPITAL OUTPATIENT SURGERY
Discharge: HOME | End: 2020-08-03
Attending: INTERNAL MEDICINE | Admitting: INTERNAL MEDICINE

## 2020-08-03 VITALS
TEMPERATURE: 98.8 F | WEIGHT: 145.6 LBS | SYSTOLIC BLOOD PRESSURE: 120 MMHG | DIASTOLIC BLOOD PRESSURE: 76 MMHG | RESPIRATION RATE: 20 BRPM | HEART RATE: 75 BPM | OXYGEN SATURATION: 100 % | BODY MASS INDEX: 22.14 KG/M2

## 2020-08-03 DIAGNOSIS — R11.14 BILIOUS VOMITING WITH NAUSEA: ICD-10-CM

## 2020-08-03 PROCEDURE — 25010000002 PROPOFOL 10 MG/ML EMULSION: Performed by: NURSE ANESTHETIST, CERTIFIED REGISTERED

## 2020-08-03 PROCEDURE — 43239 EGD BIOPSY SINGLE/MULTIPLE: CPT | Performed by: INTERNAL MEDICINE

## 2020-08-03 PROCEDURE — 88305 TISSUE EXAM BY PATHOLOGIST: CPT | Performed by: INTERNAL MEDICINE

## 2020-08-03 RX ORDER — LIDOCAINE HYDROCHLORIDE 20 MG/ML
INJECTION, SOLUTION INFILTRATION; PERINEURAL AS NEEDED
Status: DISCONTINUED | OUTPATIENT
Start: 2020-08-03 | End: 2020-08-03 | Stop reason: SURG

## 2020-08-03 RX ORDER — LIDOCAINE HYDROCHLORIDE 10 MG/ML
0.5 INJECTION, SOLUTION EPIDURAL; INFILTRATION; INTRACAUDAL; PERINEURAL ONCE AS NEEDED
Status: DISCONTINUED | OUTPATIENT
Start: 2020-08-03 | End: 2020-08-03 | Stop reason: HOSPADM

## 2020-08-03 RX ORDER — SODIUM CHLORIDE, SODIUM LACTATE, POTASSIUM CHLORIDE, CALCIUM CHLORIDE 600; 310; 30; 20 MG/100ML; MG/100ML; MG/100ML; MG/100ML
9 INJECTION, SOLUTION INTRAVENOUS CONTINUOUS
Status: DISCONTINUED | OUTPATIENT
Start: 2020-08-03 | End: 2020-08-03 | Stop reason: HOSPADM

## 2020-08-03 RX ORDER — PROPOFOL 10 MG/ML
VIAL (ML) INTRAVENOUS AS NEEDED
Status: DISCONTINUED | OUTPATIENT
Start: 2020-08-03 | End: 2020-08-03 | Stop reason: SURG

## 2020-08-03 RX ORDER — SODIUM CHLORIDE 0.9 % (FLUSH) 0.9 %
10 SYRINGE (ML) INJECTION AS NEEDED
Status: DISCONTINUED | OUTPATIENT
Start: 2020-08-03 | End: 2020-08-03 | Stop reason: HOSPADM

## 2020-08-03 RX ORDER — GLYCOPYRROLATE 0.2 MG/ML
INJECTION INTRAMUSCULAR; INTRAVENOUS AS NEEDED
Status: DISCONTINUED | OUTPATIENT
Start: 2020-08-03 | End: 2020-08-03 | Stop reason: SURG

## 2020-08-03 RX ORDER — SODIUM CHLORIDE, SODIUM LACTATE, POTASSIUM CHLORIDE, CALCIUM CHLORIDE 600; 310; 30; 20 MG/100ML; MG/100ML; MG/100ML; MG/100ML
100 INJECTION, SOLUTION INTRAVENOUS CONTINUOUS
Status: DISCONTINUED | OUTPATIENT
Start: 2020-08-03 | End: 2020-08-03 | Stop reason: HOSPADM

## 2020-08-03 RX ORDER — ONDANSETRON 2 MG/ML
4 INJECTION INTRAMUSCULAR; INTRAVENOUS ONCE AS NEEDED
Status: DISCONTINUED | OUTPATIENT
Start: 2020-08-03 | End: 2020-08-03 | Stop reason: HOSPADM

## 2020-08-03 RX ORDER — SODIUM CHLORIDE 9 MG/ML
40 INJECTION, SOLUTION INTRAVENOUS AS NEEDED
Status: DISCONTINUED | OUTPATIENT
Start: 2020-08-03 | End: 2020-08-03 | Stop reason: HOSPADM

## 2020-08-03 RX ORDER — SODIUM CHLORIDE 0.9 % (FLUSH) 0.9 %
10 SYRINGE (ML) INJECTION EVERY 12 HOURS SCHEDULED
Status: DISCONTINUED | OUTPATIENT
Start: 2020-08-03 | End: 2020-08-03 | Stop reason: HOSPADM

## 2020-08-03 RX ADMIN — PROPOFOL 50 MG: 10 INJECTION, EMULSION INTRAVENOUS at 08:59

## 2020-08-03 RX ADMIN — PROPOFOL 50 MG: 10 INJECTION, EMULSION INTRAVENOUS at 09:07

## 2020-08-03 RX ADMIN — LIDOCAINE HYDROCHLORIDE 100 MG: 20 INJECTION, SOLUTION INFILTRATION; PERINEURAL at 08:57

## 2020-08-03 RX ADMIN — PROPOFOL 50 MG: 10 INJECTION, EMULSION INTRAVENOUS at 09:03

## 2020-08-03 RX ADMIN — GLYCOPYRROLATE 0.1 MG: 0.2 INJECTION INTRAMUSCULAR; INTRAVENOUS at 08:57

## 2020-08-03 RX ADMIN — PROPOFOL 50 MG: 10 INJECTION, EMULSION INTRAVENOUS at 09:01

## 2020-08-03 RX ADMIN — PROPOFOL 50 MG: 10 INJECTION, EMULSION INTRAVENOUS at 09:05

## 2020-08-03 RX ADMIN — PROPOFOL 50 MG: 10 INJECTION, EMULSION INTRAVENOUS at 08:57

## 2020-08-03 NOTE — OP NOTE
ESOPHAGOGASTRODUODENOSCOPY  Procedure Report    Patient Name:  Liza Casillas  YOB: 2000    Date of Surgery:  8/3/2020     Indications:  Bilious vomiting with nausea [R11.14]      Pre-op Diagnosis:   Bilious vomiting with nausea [R11.14]    Post-Op Diagnosis Codes:     * Bilious vomiting with nausea [R11.14]     * Reflux esophagitis [K21.0]         Procedure/CPT® Codes:      Procedure(s):  ESOPHAGOGASTRODUODENOSCOPY WITH BIOPSIES    Staff:  Surgeon(s):  Gabe Ha MD         Anesthesia: Monitored Anesthesia Care    Estimated Blood Loss: none    Specimens:   ID Type Source Tests Collected by Time   A : BX Tissue Small Intestine, Duodenum TISSUE PATHOLOGY EXAM Gabe Ha MD 8/3/2020 0903   B : BX Tissue Stomach TISSUE PATHOLOGY EXAM Gabe Ha MD 8/3/2020 0904   C : BX Tissue Esophagus, Distal TISSUE PATHOLOGY EXAM Gabe Ha MD 8/3/2020 0904       Implants:    Nothing was implanted during the procedure      Description of Procedure: After having signed informed consent, she was brought to the endoscopy suite, placed in left lateral decubitus position and given her IV sedation. A bite block was placed between her incisors. The scope was introduced in the oropharynx, advanced under direct visualization in the esophagus, through the distal esophagus. The Z-line was only mildly irregular. There were no active ulcers or strictures. The scope was advanced in the stomach and retroflexed revealing normal cardia and fundus. The scope was deretroflexed and advanced in the antrum. The antrum was normal-appearing. The scope was advanced through the pylorus, through the duodenal bulb which was examined and normal, around the angle up to the 2nd and 3rd portions of the duodenum revealing a normal ampulla as well as otherwise normal duodenal mucosa. Biopsies were taken to rule out celiac disease. The scope was withdrawn back in the antrum and  biopsies were taken to rule out H. pylori. The scope was withdrawn in the distal esophagus and biopsies were taken in a targeted fashion to rule out short segment Hastings’s esophagus. As the scope was withdrawn, the remainder of the esophagus was normal-appearing. The scope was taken from the patient. She tolerated the procedure very well.           Findings: Normal Duodenum-Biopsy  Normal Stomach-Biopsy  Reflux Esophagitis-Biopsy     Complications: None    Recommendations: Results to be called.      Gabe Ha MD     Date: 8/3/2020  Time: 09:12

## 2020-08-03 NOTE — ANESTHESIA POSTPROCEDURE EVALUATION
Patient: Liza Casillas    Procedure Summary     Date:  08/03/20 Room / Location:  [facility] ENDOSCOPY 1 /  [facility] OR    Anesthesia Start:  0853 Anesthesia Stop:  0910    Procedure:  ESOPHAGOGASTRODUODENOSCOPY WITH BIOPSIES (N/A Esophagus) Diagnosis:       Bilious vomiting with nausea      Reflux esophagitis      (Bilious vomiting with nausea [R11.14])    Surgeon:  Gabe Ha MD Provider:  Sarah Pantoja CRNA    Anesthesia Type:  MAC ASA Status:  1          Anesthesia Type: MAC    Vitals  Vitals Value Taken Time   BP 98/65 8/3/2020  9:14 AM   Temp 98.8 °F (37.1 °C) 8/3/2020  9:14 AM   Pulse 94 8/3/2020  9:14 AM   Resp 20 8/3/2020  9:14 AM   SpO2 99 % 8/3/2020  9:14 AM           Post Anesthesia Care and Evaluation    Patient location during evaluation: bedside  Patient participation: complete - patient participated  Level of consciousness: awake  Pain score: 0  Pain management: adequate  Airway patency: patent  Anesthetic complications: No anesthetic complications  PONV Status: none  Cardiovascular status: acceptable  Respiratory status: acceptable  Hydration status: acceptable

## 2020-08-03 NOTE — INTERVAL H&P NOTE
Vital Signs  /67 (BP Location: Left arm, Patient Position: Lying)   Pulse 69   Temp 98.7 °F (37.1 °C) (Oral)   Resp 14   Wt 66 kg (145 lb 9.6 oz)   SpO2 99%   BMI 22.14 kg/m²     H&P reviewed. The patient was examined and there are no changes to the H&P.

## 2020-08-03 NOTE — BRIEF OP NOTE
ESOPHAGOGASTRODUODENOSCOPY  Progress Note    Liza Casillas  8/3/2020    Pre-op Diagnosis:   Bilious vomiting with nausea [R11.14]       Post-Op Diagnosis Codes:     * Bilious vomiting with nausea [R11.14]     * Reflux esophagitis [K21.0]    Procedure/CPT® Codes:        Procedure(s):  ESOPHAGOGASTRODUODENOSCOPY WITH BIOPSIES    Surgeon(s):  Gabe Ha MD    Anesthesia: Monitored Anesthesia Care    Staff:   Circulator: Geri Matias RN  Scrub Person: Caprice Murillo         Estimated Blood Loss: none    Urine Voided: * No values recorded between 8/3/2020  8:52 AM and 8/3/2020  9:08 AM *    Specimens:                Specimens     ID Source Type Tests Collected By Collected At Frozen?      A Small Intestine, Duodenum Tissue · TISSUE PATHOLOGY EXAM   Gabe Ha MD 8/3/20 0903      Description: BX    B Stomach Tissue · TISSUE PATHOLOGY EXAM   Gabe Ha MD 8/3/20 0904      Description: BX    C Esophagus, Distal Tissue · TISSUE PATHOLOGY EXAM   Gabe Ha MD 8/3/20 0904      Description: BX                Drains: * No LDAs found *    Findings: Normal Duodenum-Biopsy  Normal Stomach-Biopsy  Reflux Esophagitis-Biopsy    Complications: None          Gabe Ha MD     Date: 8/3/2020  Time: 09:10

## 2020-08-03 NOTE — ANESTHESIA PREPROCEDURE EVALUATION
Anesthesia Evaluation     Patient summary reviewed and Nursing notes reviewed   no history of anesthetic complications:  NPO Solid Status: > 8 hours  NPO Liquid Status: > 8 hours           Airway   Mallampati: I  TM distance: >3 FB  Neck ROM: full  No difficulty expected  Dental - normal exam     Pulmonary - negative pulmonary ROS and normal exam    breath sounds clear to auscultation  Cardiovascular - negative cardio ROS and normal exam  Exercise tolerance: excellent (>7 METS)    Rhythm: regular  Rate: normal        Neuro/Psych- negative ROS  GI/Hepatic/Renal/Endo      ROS Comment: abd pain, N&V    Musculoskeletal (-) negative ROS    (-) neck pain  Abdominal  - normal exam   Substance History - negative use     OB/GYN negative ob/gyn ROS         Other - negative ROS                       Anesthesia Plan    ASA 1     MAC       Anesthetic plan, all risks, benefits, and alternatives have been provided, discussed and informed consent has been obtained with: patient.  Use of blood products discussed with patient  Consented to blood products.

## 2020-08-04 LAB
CYTO UR: NORMAL
LAB AP CASE REPORT: NORMAL
PATH REPORT.FINAL DX SPEC: NORMAL
PATH REPORT.GROSS SPEC: NORMAL

## 2020-08-14 ENCOUNTER — TELEPHONE (OUTPATIENT)
Dept: INTERNAL MEDICINE | Facility: CLINIC | Age: 20
End: 2020-08-14

## 2020-08-14 NOTE — TELEPHONE ENCOUNTER
PT RECEIVED A TEST ON AUGUST 3RD AND PT HAS NOT RECEIVED RESULTS.     PLEASE ADVISE     PT CALL BACK   [phone]

## 2020-08-19 ENCOUNTER — TELEMEDICINE (OUTPATIENT)
Dept: INTERNAL MEDICINE | Facility: CLINIC | Age: 20
End: 2020-08-19

## 2020-08-19 DIAGNOSIS — Z20.7 SCABIES EXPOSURE: Primary | ICD-10-CM

## 2020-08-19 PROCEDURE — 99213 OFFICE O/P EST LOW 20 MIN: CPT | Performed by: NURSE PRACTITIONER

## 2020-08-19 RX ORDER — PERMETHRIN 50 MG/G
CREAM TOPICAL
Qty: 60 G | Refills: 0 | Status: SHIPPED | OUTPATIENT
Start: 2020-08-19 | End: 2022-06-20

## 2020-08-19 NOTE — PROGRESS NOTES
Possible scabies    Subjective     Liza Casillas is a 19 y.o. female being seen for an acute appointment today regarding possible scabies. She recently move into a house together. Her roommate had scabies on her hand, and just got a script to treat it. They share a couch, sometimes lay in each others beds, and have close contact. She denies rash at this time.     This patient has consented to a video visit due to Covid 19 pandemic in accordance with current CDC guidelines.    History of Present Illness     Allergies   Allergen Reactions   • Sulfa Antibiotics Rash and Hives     And vomiting           Current Outpatient Medications:   •  norgestimate-ethinyl estradiol (ORTHO TRI-CYCLEN,TRINESSA) 0.18/0.215/0.25 MG-35 MCG per tablet, Take  by mouth., Disp: , Rfl:   •  omeprazole (priLOSEC) 40 MG capsule, Take 1 capsule by mouth Daily., Disp: 90 capsule, Rfl: 3  •  ondansetron (ZOFRAN) 4 MG tablet, Take 1 tablet by mouth Every 12 (Twelve) Hours As Needed for Nausea or Vomiting., Disp: 30 tablet, Rfl: 5  •  Probiotic Product (PROBIOTIC PO), Take  by mouth., Disp: , Rfl:     The following portions of the patient's history were reviewed and updated as appropriate: allergies, current medications, past family history, past medical history, past social history, past surgical history and problem list.    Review of Systems   Constitutional: Negative.    HENT: Negative.    Eyes: Negative.    Respiratory: Negative.    Cardiovascular: Negative.  Negative for leg swelling.   Gastrointestinal: Negative.    Endocrine: Negative.    Musculoskeletal: Negative.    Skin: Negative.    Allergic/Immunologic: Negative.    Neurological: Negative.    All other systems reviewed and are negative.      Assessment     Physical Exam   Constitutional: She is oriented to person, place, and time. She appears well-developed and well-nourished. No distress.   Neurological: She is alert and oriented to person, place, and time.   Skin: Skin is warm and  dry. No rash noted. No erythema.   Psychiatric: She has a normal mood and affect. Her behavior is normal.       Plan         Liza was seen today for rash.    Diagnoses and all orders for this visit:    Scabies exposure  -     permethrin (ELIMITE) 5 % cream; Apply once from head to toe, leave on for 8-10 hours. Rinse off in the morning      Discussed household precautions and treatment for scables. Wash clothes in hot water. Spray couches sand fabric that cannot be washed. Treat entire household at the same time.     Follow up prn     01-Nov-2017 22:27

## 2020-10-08 ENCOUNTER — OFFICE VISIT (OUTPATIENT)
Dept: GASTROENTEROLOGY | Facility: CLINIC | Age: 20
End: 2020-10-08

## 2020-10-08 VITALS — BODY MASS INDEX: 23.58 KG/M2 | TEMPERATURE: 97.4 F | WEIGHT: 155.6 LBS | HEIGHT: 68 IN

## 2020-10-08 DIAGNOSIS — R10.13 EPIGASTRIC PAIN: ICD-10-CM

## 2020-10-08 DIAGNOSIS — K59.04 CHRONIC IDIOPATHIC CONSTIPATION: ICD-10-CM

## 2020-10-08 DIAGNOSIS — R11.14 BILIOUS VOMITING WITH NAUSEA: Primary | ICD-10-CM

## 2020-10-08 DIAGNOSIS — K21.00 GASTROESOPHAGEAL REFLUX DISEASE WITH ESOPHAGITIS WITHOUT HEMORRHAGE: ICD-10-CM

## 2020-10-08 PROCEDURE — 99213 OFFICE O/P EST LOW 20 MIN: CPT | Performed by: NURSE PRACTITIONER

## 2020-10-08 RX ORDER — OMEPRAZOLE 40 MG/1
40 CAPSULE, DELAYED RELEASE ORAL 2 TIMES DAILY
Qty: 180 CAPSULE | Refills: 3 | Status: SHIPPED | OUTPATIENT
Start: 2020-10-08 | End: 2022-06-20

## 2020-10-08 RX ORDER — ONDANSETRON 4 MG/1
4 TABLET, FILM COATED ORAL EVERY 12 HOURS PRN
Qty: 60 TABLET | Refills: 3 | Status: SHIPPED | OUTPATIENT
Start: 2020-10-08 | End: 2023-02-13 | Stop reason: SDUPTHER

## 2020-10-08 RX ORDER — PROMETHAZINE HYDROCHLORIDE 12.5 MG/1
12.5 TABLET ORAL EVERY 6 HOURS PRN
Qty: 45 TABLET | Refills: 1 | Status: SHIPPED | OUTPATIENT
Start: 2020-10-08

## 2020-10-08 NOTE — PROGRESS NOTES
"    PATIENT INFORMATION  Liza Casillas       - 2000    CHIEF COMPLAINT  Chief Complaint   Patient presents with   • Follow-up     2 mo follow up latoya EGD w/o Hastings's       HISTORY OF PRESENT ILLNESS  8/3/20 EGD was for N/V biopsy was Positive for Reflux  Negative for Celiac, HP,Hastings's   So avoid NSAIDS continue present meds and Follows up in 8 weeks    On the omeprazole 40mg now her hb is almost\"gone\" but still a little reflux if she drinks a lot of liquid.  Still having some bouts of nausea occasionally triggered more by if she cant sleep or is really anxious.  Overall much improved.     Uses the zofran during the day for nausea but is improving and phenergan at night as needed, needs refill.  zofran seems to make her constipated.     Discussed limiting NSAIDS and using tylenol for cramps and pain.     Also discussed Cognitive Behavioral Therapy to help with anxiety.      REVIEWED PERTINENT RESULTS/ LABS  Lab Results   Component Value Date    CASEREPORT  2020     Surgical Pathology Report                         Case: [id number]                                  Authorizing Provider:  Gabe Ha        Collected:           2020 09:03 AM                                 MD Jass                                                                   Ordering Location:     [facility]   Received:            2020 09:19 AM                                 OR                                                                           Pathologist:           Nicolás Rizvi MD                                                         Specimens:   1) - Small Intestine, Duodenum, BX                                                                  2) - Stomach, BX                                                                                    3) - Esophagus, Distal, BX                                                                 FINALDX  2020     1. Duodenum Biopsy: " Benign small intestinal mucosa with   A. Normal villous architecture.   B. No active cryptitis, crypt abscess formation, granulomatous inflammation nor         intestinal parasites identified.    2. Stomach, Biopsy: Benign gastric mucosa with   A. Mild chronic inflammation.   B. No H. pylori-like organisms identified by routine staining.    3. Distal Esophagus Biopsy: Benign predominantly squamous mucosa and scant glandular mucosa with   A. Minimal chronic inflammation without significant eosinophilia identified.   B. No microorganisms nor intestinal mataplasia identified by routine staining.   C. No dysplasia nor malignancy identified.    jat/pkm        Lab Results   Component Value Date    HGB 13.8 10/23/2016    MCV 86.3 10/23/2016     10/23/2016    ALT 44 (H) 10/23/2016    AST 45 (H) 10/23/2016      No results found.    REVIEW OF SYSTEMS  Review of Systems   All other systems reviewed and are negative.        ACTIVE PROBLEMS  Patient Active Problem List    Diagnosis   • Gastroesophageal reflux disease with esophagitis without hemorrhage [K21.00]   • Scabies exposure [Z20.89]   • Bilious vomiting with nausea [R11.14]   • Neck pain [M54.2]   • Chronic nausea [R11.0]         PAST MEDICAL HISTORY  Past Medical History:   Diagnosis Date   • Gastritis          SURGICAL HISTORY  Past Surgical History:   Procedure Laterality Date   • ENDOSCOPY N/A 8/3/2020    Procedure: ESOPHAGOGASTRODUODENOSCOPY WITH BIOPSIES;  Surgeon: Gabe Ha MD;  Location: [facility];  Service: Gastroenterology;  Laterality: N/A;  ESOPHAGITIS  REFLUX   • WISDOM TOOTH EXTRACTION           FAMILY HISTORY  Family History   Problem Relation Age of Onset   • Anxiety disorder Mother    • Anxiety disorder Maternal Grandfather    • Colon cancer Neg Hx    • Colon polyps Neg Hx    • Esophageal cancer Neg Hx    • Liver cancer Neg Hx    • Rectal cancer Neg Hx    • Stomach cancer Neg Hx          SOCIAL HISTORY  Social History  "    Occupational History   • Not on file   Tobacco Use   • Smoking status: Never Smoker   • Smokeless tobacco: Never Used   Substance and Sexual Activity   • Alcohol use: Yes     Comment: once weekly   • Drug use: No   • Sexual activity: Yes     Partners: Male     Birth control/protection: OCP         CURRENT MEDICATIONS    Current Outpatient Medications:   •  norgestimate-ethinyl estradiol (ORTHO TRI-CYCLEN,TRINESSA) 0.18/0.215/0.25 MG-35 MCG per tablet, Take  by mouth., Disp: , Rfl:   •  omeprazole (priLOSEC) 40 MG capsule, Take 1 capsule by mouth 2 (two) times a day., Disp: 180 capsule, Rfl: 3  •  ondansetron (Zofran) 4 MG tablet, Take 1 tablet by mouth Every 12 (Twelve) Hours As Needed for Nausea or Vomiting., Disp: 60 tablet, Rfl: 3  •  permethrin (ELIMITE) 5 % cream, Apply once from head to toe, leave on for 8-10 hours. Rinse off in the morning, Disp: 60 g, Rfl: 0  •  Probiotic Product (PROBIOTIC PO), Take  by mouth., Disp: , Rfl:   •  promethazine (PHENERGAN) 12.5 MG tablet, Take 1 tablet by mouth Every 6 (Six) Hours As Needed for Nausea or Vomiting., Disp: 45 tablet, Rfl: 1    ALLERGIES  Sulfa antibiotics    VITALS  Vitals:    10/08/20 1050   Temp: 97.4 °F (36.3 °C)   TempSrc: Temporal   Weight: 70.6 kg (155 lb 9.6 oz)   Height: 172.7 cm (67.99\")       PHYSICAL EXAM  Debilities/Disabilities Identified: None  Emotional Behavior: Appropriate  Wt Readings from Last 3 Encounters:   10/08/20 70.6 kg (155 lb 9.6 oz)   08/03/20 66 kg (145 lb 9.6 oz)   07/23/20 65.6 kg (144 lb 9.6 oz)     Ht Readings from Last 1 Encounters:   10/08/20 172.7 cm (67.99\")     Body mass index is 23.66 kg/m².  Physical Exam  Vitals signs and nursing note reviewed.   Constitutional:       Appearance: She is well-developed.   HENT:      Head: Normocephalic and atraumatic.   Eyes:      Conjunctiva/sclera: Conjunctivae normal.      Pupils: Pupils are equal, round, and reactive to light.   Neck:      Musculoskeletal: Normal range of motion " and neck supple.   Cardiovascular:      Rate and Rhythm: Normal rate and regular rhythm.   Pulmonary:      Effort: Pulmonary effort is normal.      Breath sounds: Normal breath sounds.   Abdominal:      General: Bowel sounds are normal. There is no distension.      Palpations: Abdomen is soft.      Tenderness: There is abdominal tenderness in the epigastric area and left upper quadrant.   Musculoskeletal: Normal range of motion.   Lymphadenopathy:      Cervical: No cervical adenopathy.   Skin:     General: Skin is warm and dry.   Neurological:      Mental Status: She is alert and oriented to person, place, and time.   Psychiatric:         Behavior: Behavior normal.         CLINICAL DATA REVIEWED   reviewed previous lab results and integrated with today's visit, reviewed notes from other physicians and/or last GI encounter, reviewed previous endoscopy results and available photos, reviewed surgical pathology results from previous biopsies    ASSESSMENT  Diagnoses and all orders for this visit:    Bilious vomiting with nausea  -     ondansetron (Zofran) 4 MG tablet; Take 1 tablet by mouth Every 12 (Twelve) Hours As Needed for Nausea or Vomiting.  -     omeprazole (priLOSEC) 40 MG capsule; Take 1 capsule by mouth 2 (two) times a day.  -     promethazine (PHENERGAN) 12.5 MG tablet; Take 1 tablet by mouth Every 6 (Six) Hours As Needed for Nausea or Vomiting.    Epigastric pain    Gastroesophageal reflux disease with esophagitis without hemorrhage  -     ondansetron (Zofran) 4 MG tablet; Take 1 tablet by mouth Every 12 (Twelve) Hours As Needed for Nausea or Vomiting.  -     omeprazole (priLOSEC) 40 MG capsule; Take 1 capsule by mouth 2 (two) times a day.  -     promethazine (PHENERGAN) 12.5 MG tablet; Take 1 tablet by mouth Every 6 (Six) Hours As Needed for Nausea or Vomiting.    Chronic idiopathic constipation          PLAN  Return if symptoms worsen or fail to improve.     Don't eat late, don't eat fried and don't eat  too much at one time. Avoid tomato based products like pizza, lasagna, spagetti.  If you want to have these take an over the counter Pepcid immediately before you eat them.  Do not eat within 3-4 hrs of bedtime. Limit caffeine and carbonated beverages, if you must have them do not have later in the day.  If reflux is severe elevate the head of the bed. You may also use TUMS, maalox, or mylanta for breakthrough heartburn.    Take a daily probiotic for your gut as well.    Drink lots of water, eat a high fiber diet with 25-30gm a day(check the fiber content in the foods you eat.  Protein bars with 15gm of fiber in each bar are a great supplement daily), and get regular exercise.     Discussed limiting NSAIDS and using tylenol for cramps and pain.    I have discussed the above plan with the patient.  They verbalize understanding and are in agreement with the plan.  They have been advised to contact the office for any questions, concerns, or changes related to their health.

## 2020-10-08 NOTE — PATIENT INSTRUCTIONS
Don't eat late, don't eat fried and don't eat too much at one time. Avoid tomato based products like pizza, lasagna, spagetti.  If you want to have these take an over the counter Pepcid immediately before you eat them.  Do not eat within 3-4 hrs of bedtime. Limit caffeine and carbonated beverages, if you must have them do not have later in the day.  If reflux is severe elevate the head of the bed. You may also use TUMS, maalox, or mylanta for breakthrough heartburn.    Take a daily probiotic for your gut as well.    Drink lots of water, eat a high fiber diet with 25-30gm a day(check the fiber content in the foods you eat.  Protein bars with 15gm of fiber in each bar are a great supplement daily), and get regular exercise.

## 2022-06-20 ENCOUNTER — OFFICE VISIT (OUTPATIENT)
Dept: INTERNAL MEDICINE | Facility: CLINIC | Age: 22
End: 2022-06-20

## 2022-06-20 VITALS
OXYGEN SATURATION: 97 % | WEIGHT: 138.2 LBS | HEART RATE: 67 BPM | SYSTOLIC BLOOD PRESSURE: 118 MMHG | BODY MASS INDEX: 20.95 KG/M2 | HEIGHT: 68 IN | DIASTOLIC BLOOD PRESSURE: 65 MMHG | TEMPERATURE: 98.5 F

## 2022-06-20 DIAGNOSIS — F41.1 GENERALIZED ANXIETY DISORDER: Primary | ICD-10-CM

## 2022-06-20 PROCEDURE — 99213 OFFICE O/P EST LOW 20 MIN: CPT | Performed by: FAMILY MEDICINE

## 2022-06-20 RX ORDER — CALCIUM CARBONATE 200(500)MG
1 TABLET,CHEWABLE ORAL DAILY
COMMUNITY

## 2022-06-20 NOTE — PROGRESS NOTES
"Subjective   Liza Casillas is a 21 y.o. female presenting today for follow up of   Chief Complaint   Patient presents with   • Anxiety       History of Present Illness     Pt presents to discuss anxiety.  She states she has had trouble with anxiety her whole life.  She can remember having panic attacks as a child and in high school.  She has a family history of anxiety.  Exercise helps significantly.  She has concluded over the past year being out of college and entering \"real life\" that this has flared she is interested in medication.    Patient Active Problem List   Diagnosis   • Neck pain   • Chronic nausea   • Bilious vomiting with nausea   • Scabies exposure   • Gastroesophageal reflux disease with esophagitis without hemorrhage   • Generalized anxiety disorder       Current Outpatient Medications on File Prior to Visit   Medication Sig   • calcium carbonate (TUMS) 500 MG chewable tablet Chew 1 tablet Daily.   • norgestimate-ethinyl estradiol (ORTHO TRI-CYCLEN,TRINESSA) 0.18/0.215/0.25 MG-35 MCG per tablet Take  by mouth.   • ondansetron (Zofran) 4 MG tablet Take 1 tablet by mouth Every 12 (Twelve) Hours As Needed for Nausea or Vomiting.   • Probiotic Product (PROBIOTIC PO) Take  by mouth.   • promethazine (PHENERGAN) 12.5 MG tablet Take 1 tablet by mouth Every 6 (Six) Hours As Needed for Nausea or Vomiting.   • [DISCONTINUED] omeprazole (priLOSEC) 40 MG capsule Take 1 capsule by mouth 2 (two) times a day.   • [DISCONTINUED] permethrin (ELIMITE) 5 % cream Apply once from head to toe, leave on for 8-10 hours. Rinse off in the morning     No current facility-administered medications on file prior to visit.          The following portions of the patient's history were reviewed and updated as appropriate: allergies, current medications, past family history, past medical history, past social history, past surgical history and problem list.    Review of Systems   Constitutional: Negative.    Psychiatric/Behavioral: " "Positive for sleep disturbance and stress. Negative for depressed mood (rare). The patient is nervous/anxious.        Objective   Vitals:    06/20/22 1057   BP: 118/65   Pulse: 67   Temp: 98.5 °F (36.9 °C)   SpO2: 97%   Weight: 62.7 kg (138 lb 3.2 oz)   Height: 172.7 cm (68\")       BP Readings from Last 3 Encounters:   06/20/22 118/65   08/03/20 120/76   05/08/20 112/80        Wt Readings from Last 3 Encounters:   06/20/22 62.7 kg (138 lb 3.2 oz)   10/08/20 70.6 kg (155 lb 9.6 oz)   08/03/20 66 kg (145 lb 9.6 oz)        Body mass index is 21.01 kg/m².  Nursing notes and vitals reviewed.    Physical Exam  Vitals and nursing note reviewed.   Constitutional:       Appearance: Normal appearance.   HENT:      Head: Normocephalic and atraumatic.      Mouth/Throat:      Mouth: Mucous membranes are moist.   Eyes:      Extraocular Movements: Extraocular movements intact.      Conjunctiva/sclera: Conjunctivae normal.   Cardiovascular:      Rate and Rhythm: Normal rate and regular rhythm.   Pulmonary:      Effort: Pulmonary effort is normal.      Breath sounds: Normal breath sounds.   Musculoskeletal:      Cervical back: Neck supple. No rigidity.      Right lower leg: No edema.      Left lower leg: No edema.   Skin:     General: Skin is warm and dry.   Neurological:      General: No focal deficit present.      Mental Status: She is alert and oriented to person, place, and time.   Psychiatric:         Mood and Affect: Mood normal.         Behavior: Behavior normal.         Thought Content: Thought content normal.         Judgment: Judgment normal.         No results found for this or any previous visit (from the past 672 hour(s)).      Assessment & Plan   Diagnoses and all orders for this visit:    1. Generalized anxiety disorder (Primary)  -     sertraline (Zoloft) 50 MG tablet; Take 1 tablet by mouth Daily.  Dispense: 30 tablet; Refill: 2    Lifestyle measures discussed.  Pt to look into counseling.  Start sertraline 50 mg " daily.  Anticipatory guidance given.        Medications, including side effects, were discussed with the patient. Patient verbalized understanding.  The plan of care was discussed. All questions were answered. Patient verbalized understanding.      Return in about 5 weeks (around 7/25/2022).

## 2022-06-26 RX ORDER — OMEPRAZOLE 10 MG/1
CAPSULE, DELAYED RELEASE ORAL
COMMUNITY

## 2022-06-26 RX ORDER — NORGESTIMATE AND ETHINYL ESTRADIOL 7DAYSX3 28
KIT ORAL
COMMUNITY

## 2022-07-28 ENCOUNTER — OFFICE VISIT (OUTPATIENT)
Dept: INTERNAL MEDICINE | Facility: CLINIC | Age: 22
End: 2022-07-28

## 2022-07-28 VITALS
WEIGHT: 137.2 LBS | OXYGEN SATURATION: 99 % | DIASTOLIC BLOOD PRESSURE: 80 MMHG | HEIGHT: 68 IN | HEART RATE: 62 BPM | SYSTOLIC BLOOD PRESSURE: 120 MMHG | BODY MASS INDEX: 20.79 KG/M2 | TEMPERATURE: 97.7 F

## 2022-07-28 DIAGNOSIS — F41.1 GENERALIZED ANXIETY DISORDER: Primary | ICD-10-CM

## 2022-07-28 PROCEDURE — 99213 OFFICE O/P EST LOW 20 MIN: CPT | Performed by: FAMILY MEDICINE

## 2022-07-28 NOTE — PROGRESS NOTES
Subjective   Liza Casillas is a 21 y.o. female presenting today for follow up of   Chief Complaint   Patient presents with   • Anxiety     5 week follow up       History of Present Illness     Pt here for 5 week follow-up on generalized anxiety disorder.  She is taking sertraline 50 mg daily and is tolerating it well.  She is feeling much better.  She hasn't been having panic attacks.  She still feels a little anxious at times and wonders about taking a higher dose.  She is trying to find a therapist as well.  Denies SI.    Patient Active Problem List   Diagnosis   • Neck pain   • Chronic nausea   • Bilious vomiting with nausea   • Scabies exposure   • Gastroesophageal reflux disease with esophagitis without hemorrhage   • Generalized anxiety disorder       Current Outpatient Medications on File Prior to Visit   Medication Sig   • calcium carbonate (TUMS) 500 MG chewable tablet Chew 1 tablet Daily.   • norgestimate-ethinyl estradiol (ORTHO TRI-CYCLEN,TRINESSA) 0.18/0.215/0.25 MG-35 MCG per tablet Take  by mouth.   • ondansetron (Zofran) 4 MG tablet Take 1 tablet by mouth Every 12 (Twelve) Hours As Needed for Nausea or Vomiting.   • promethazine (PHENERGAN) 12.5 MG tablet Take 1 tablet by mouth Every 6 (Six) Hours As Needed for Nausea or Vomiting. (Patient taking differently: Take 12.5 mg by mouth As Needed for Nausea or Vomiting.)   • [DISCONTINUED] sertraline (Zoloft) 50 MG tablet Take 1 tablet by mouth Daily.   • Probiotic Product (PROBIOTIC PO) Take  by mouth.     No current facility-administered medications on file prior to visit.          The following portions of the patient's history were reviewed and updated as appropriate: allergies, current medications, past family history, past medical history, past social history, past surgical history and problem list.    Review of Systems    Objective   Vitals:    07/28/22 1319   BP: 120/80   BP Location: Left arm   Patient Position: Sitting   Pulse: 62   Temp: 97.7 °F  "(36.5 °C)   SpO2: 99%   Weight: 62.2 kg (137 lb 3.2 oz)   Height: 172.7 cm (68\")       BP Readings from Last 3 Encounters:   07/28/22 120/80   06/20/22 118/65   08/03/20 120/76        Wt Readings from Last 3 Encounters:   07/28/22 62.2 kg (137 lb 3.2 oz)   06/20/22 62.7 kg (138 lb 3.2 oz)   10/08/20 70.6 kg (155 lb 9.6 oz)        Body mass index is 20.86 kg/m².  Nursing notes and vitals reviewed.    Physical Exam  Vitals and nursing note reviewed.   Constitutional:       Appearance: Normal appearance.   HENT:      Head: Normocephalic and atraumatic.      Mouth/Throat:      Mouth: Mucous membranes are moist.   Eyes:      Extraocular Movements: Extraocular movements intact.      Conjunctiva/sclera: Conjunctivae normal.   Cardiovascular:      Rate and Rhythm: Normal rate and regular rhythm.      Heart sounds: Normal heart sounds.   Pulmonary:      Effort: Pulmonary effort is normal.      Breath sounds: Normal breath sounds.   Abdominal:      Palpations: Abdomen is soft.      Tenderness: There is no abdominal tenderness.   Musculoskeletal:      Cervical back: Neck supple. No rigidity.   Skin:     General: Skin is warm and dry.   Neurological:      General: No focal deficit present.      Mental Status: She is alert and oriented to person, place, and time.   Psychiatric:         Mood and Affect: Mood normal.         Behavior: Behavior normal.         No results found for this or any previous visit (from the past 672 hour(s)).      Assessment & Plan   Diagnoses and all orders for this visit:    1. Generalized anxiety disorder (Primary)  -     sertraline (Zoloft) 50 MG tablet; Take 2 tablets by mouth Daily.  Dispense: 60 tablet; Refill: 6      Improved.  Increase sertraline slowly to 100 mg daily.  Start outpatient therapy.          Medications, including side effects, were discussed with the patient. Patient verbalized understanding.  The plan of care was discussed. All questions were answered. Patient verbalized " understanding.      Return in about 6 months (around 1/28/2023).

## 2023-01-30 ENCOUNTER — TELEPHONE (OUTPATIENT)
Dept: INTERNAL MEDICINE | Facility: CLINIC | Age: 23
End: 2023-01-30

## 2023-01-30 NOTE — TELEPHONE ENCOUNTER
Ok for HUB to read    I called to check on this patient because we received a message stating that she got a flat tire and she might be late for her 1pm appt with . I told her to give me a call back when she received this message.

## 2023-02-13 ENCOUNTER — OFFICE VISIT (OUTPATIENT)
Dept: INTERNAL MEDICINE | Facility: CLINIC | Age: 23
End: 2023-02-13
Payer: COMMERCIAL

## 2023-02-13 VITALS
OXYGEN SATURATION: 98 % | HEART RATE: 84 BPM | BODY MASS INDEX: 22.43 KG/M2 | TEMPERATURE: 98.7 F | DIASTOLIC BLOOD PRESSURE: 72 MMHG | HEIGHT: 68 IN | SYSTOLIC BLOOD PRESSURE: 124 MMHG | WEIGHT: 148 LBS

## 2023-02-13 DIAGNOSIS — R11.14 BILIOUS VOMITING WITH NAUSEA: ICD-10-CM

## 2023-02-13 DIAGNOSIS — K21.00 GASTROESOPHAGEAL REFLUX DISEASE WITH ESOPHAGITIS WITHOUT HEMORRHAGE: ICD-10-CM

## 2023-02-13 DIAGNOSIS — F41.1 GENERALIZED ANXIETY DISORDER: Primary | ICD-10-CM

## 2023-02-13 DIAGNOSIS — F90.9 HYPERACTIVITY: ICD-10-CM

## 2023-02-13 PROCEDURE — 99214 OFFICE O/P EST MOD 30 MIN: CPT | Performed by: FAMILY MEDICINE

## 2023-02-13 RX ORDER — SERTRALINE HYDROCHLORIDE 100 MG/1
100 TABLET, FILM COATED ORAL DAILY
Qty: 90 TABLET | Refills: 3 | Status: SHIPPED | OUTPATIENT
Start: 2023-02-13 | End: 2023-02-13 | Stop reason: SDUPTHER

## 2023-02-13 RX ORDER — ONDANSETRON 4 MG/1
4 TABLET, FILM COATED ORAL EVERY 12 HOURS PRN
Qty: 60 TABLET | Refills: 3 | Status: SHIPPED | OUTPATIENT
Start: 2023-02-13 | End: 2023-02-13 | Stop reason: SDUPTHER

## 2023-02-13 RX ORDER — SERTRALINE HYDROCHLORIDE 100 MG/1
100 TABLET, FILM COATED ORAL DAILY
Qty: 90 TABLET | Refills: 3 | Status: SHIPPED | OUTPATIENT
Start: 2023-02-13 | End: 2023-04-04 | Stop reason: SDUPTHER

## 2023-02-13 RX ORDER — ONDANSETRON 4 MG/1
4 TABLET, FILM COATED ORAL EVERY 12 HOURS PRN
Qty: 60 TABLET | Refills: 3 | Status: SHIPPED | OUTPATIENT
Start: 2023-02-13

## 2023-02-13 NOTE — PROGRESS NOTES
Subjective   Liza Casillas is a 22 y.o. female presenting today for follow up of   Chief Complaint   Patient presents with   • Anxiety     Follow up       History of Present Illness     Pt here for 6 mo f/u on generalized anxiety disorder.  She takes sertraline 100 mg daily.  She is tolerating this well.  She says she is doing fairly well.  She has had a slight increase in anxiety and has started therapy and thinks that this is helpful.  She says her therapist is wondering if she has ADHD as she is fidgety and loses things and has always been somewhat hyperactive.      Patient Active Problem List   Diagnosis   • Neck pain   • Chronic nausea   • Bilious vomiting with nausea   • Scabies exposure   • Gastroesophageal reflux disease with esophagitis without hemorrhage   • Generalized anxiety disorder       Current Outpatient Medications on File Prior to Visit   Medication Sig   • calcium carbonate (TUMS) 500 MG chewable tablet Chew 1 tablet Daily.   • norgestimate-ethinyl estradiol (ORTHO TRI-CYCLEN,TRINESSA) 0.18/0.215/0.25 MG-35 MCG per tablet Take  by mouth.   • Probiotic Product (PROBIOTIC PO) Take  by mouth.   • promethazine (PHENERGAN) 12.5 MG tablet Take 1 tablet by mouth Every 6 (Six) Hours As Needed for Nausea or Vomiting. (Patient taking differently: Take 12.5 mg by mouth As Needed for Nausea or Vomiting.)   • [DISCONTINUED] ondansetron (Zofran) 4 MG tablet Take 1 tablet by mouth Every 12 (Twelve) Hours As Needed for Nausea or Vomiting.   • [DISCONTINUED] sertraline (Zoloft) 50 MG tablet Take 2 tablets by mouth Daily.     No current facility-administered medications on file prior to visit.          The following portions of the patient's history were reviewed and updated as appropriate: allergies, current medications, past family history, past medical history, past social history, past surgical history and problem list.    Review of Systems   Constitutional: Negative.    HENT: Negative.    Respiratory:  "Negative.    Cardiovascular: Negative.    Neurological: Negative.    Psychiatric/Behavioral: Positive for positive for hyperactivity. Negative for dysphoric mood, sleep disturbance, suicidal ideas, depressed mood and stress. The patient is not nervous/anxious.        Objective   Vitals:    02/13/23 1331   BP: 124/72   BP Location: Right arm   Pulse: 84   Temp: 98.7 °F (37.1 °C)   TempSrc: Infrared   SpO2: 98%   Weight: 67.1 kg (148 lb)   Height: 172.7 cm (68\")       BP Readings from Last 3 Encounters:   02/13/23 124/72   07/28/22 120/80   06/20/22 118/65        Wt Readings from Last 3 Encounters:   02/13/23 67.1 kg (148 lb)   07/28/22 62.2 kg (137 lb 3.2 oz)   06/20/22 62.7 kg (138 lb 3.2 oz)        Body mass index is 22.5 kg/m².  Nursing notes and vitals reviewed.    Physical Exam  Vitals and nursing note reviewed.   Constitutional:       Appearance: Normal appearance. She is well-developed.   HENT:      Head: Normocephalic and atraumatic.      Mouth/Throat:      Mouth: Mucous membranes are moist.   Eyes:      Extraocular Movements: Extraocular movements intact.      Conjunctiva/sclera: Conjunctivae normal.   Neck:      Thyroid: No thyromegaly.   Cardiovascular:      Rate and Rhythm: Normal rate and regular rhythm.      Heart sounds: Normal heart sounds.   Pulmonary:      Effort: Pulmonary effort is normal.      Breath sounds: Normal breath sounds.   Abdominal:      Palpations: Abdomen is soft.      Tenderness: There is no abdominal tenderness.   Musculoskeletal:      Cervical back: Neck supple. No rigidity.      Right lower leg: No edema.      Left lower leg: No edema.   Skin:     General: Skin is warm and dry.   Neurological:      General: No focal deficit present.      Mental Status: She is alert and oriented to person, place, and time.   Psychiatric:         Mood and Affect: Mood normal.         Behavior: Behavior normal.       No results found for this or any previous visit (from the past 672 " hour(s)).      Assessment & Plan   Diagnoses and all orders for this visit:    1. Generalized anxiety disorder (Primary)  -     sertraline (Zoloft) 100 MG tablet; Take 1 tablet by mouth Daily.  Dispense: 90 tablet; Refill: 3    2. Hyperactivity  -     NeuroPsych Testing; Future    3. Bilious vomiting with nausea  -     ondansetron (Zofran) 4 MG tablet; Take 1 tablet by mouth Every 12 (Twelve) Hours As Needed for Nausea or Vomiting.  Dispense: 60 tablet; Refill: 3    4. Gastroesophageal reflux disease with esophagitis without hemorrhage  -     ondansetron (Zofran) 4 MG tablet; Take 1 tablet by mouth Every 12 (Twelve) Hours As Needed for Nausea or Vomiting.  Dispense: 60 tablet; Refill: 3      ED.  Controlled with sertraline and therapy.    Hyperactivity.  She would like to be tested for ADHD so will refer for neuropsych testing.      Medications, including side effects, were discussed with the patient. Patient verbalized understanding.  The plan of care was discussed. All questions were answered. Patient verbalized understanding.      Return in about 1 year (around 2/13/2024).

## 2023-03-27 DIAGNOSIS — F41.1 GENERALIZED ANXIETY DISORDER: ICD-10-CM

## 2023-03-27 RX ORDER — SERTRALINE HYDROCHLORIDE 100 MG/1
100 TABLET, FILM COATED ORAL DAILY
Qty: 90 TABLET | Refills: 3 | Status: CANCELLED | OUTPATIENT
Start: 2023-03-27

## 2023-03-27 NOTE — TELEPHONE ENCOUNTER
Caller: Liza Casillas    Relationship: Self    Best call back number: [phone] (Mobile)    Requested Prescriptions:   Requested Prescriptions     Pending Prescriptions Disp Refills   • sertraline (Zoloft) 100 MG tablet 90 tablet 3     Sig: Take 1 tablet by mouth Daily.        Pharmacy where request should be sent: [facility] [phone] [place] [id number]  AT [facility]) - [phone] [facility] [phone]      Last office visit with prescribing clinician: 2/13/2023   Last telemedicine visit with prescribing clinician: Visit date not found   Next office visit with prescribing clinician: 2/12/2024     Additional details provided by patient: PATIENT IS COMPLETELY OUT OF MEDICATION AND NEEDS REFILLED ASAP    Does the patient have less than a 3 day supply:  [x] Yes  [] No    Would you like a call back once the refill request has been completed: [x] Yes [] No    If the office needs to give you a call back, can they leave a voicemail: [x] Yes [] No    Johny See Rep   03/27/23 12:19 EDT

## 2023-03-28 ENCOUNTER — PATIENT MESSAGE (OUTPATIENT)
Dept: INTERNAL MEDICINE | Facility: CLINIC | Age: 23
End: 2023-03-28
Payer: COMMERCIAL

## 2023-03-28 DIAGNOSIS — F41.1 GENERALIZED ANXIETY DISORDER: ICD-10-CM

## 2023-04-04 RX ORDER — SERTRALINE HYDROCHLORIDE 100 MG/1
100 TABLET, FILM COATED ORAL DAILY
Qty: 90 TABLET | Refills: 3 | Status: SHIPPED | OUTPATIENT
Start: 2023-04-04

## 2024-02-12 ENCOUNTER — OFFICE VISIT (OUTPATIENT)
Dept: INTERNAL MEDICINE | Facility: CLINIC | Age: 24
End: 2024-02-12
Payer: COMMERCIAL

## 2024-02-12 VITALS
BODY MASS INDEX: 22.52 KG/M2 | WEIGHT: 148.6 LBS | SYSTOLIC BLOOD PRESSURE: 104 MMHG | HEART RATE: 98 BPM | HEIGHT: 68 IN | OXYGEN SATURATION: 98 % | DIASTOLIC BLOOD PRESSURE: 68 MMHG | TEMPERATURE: 98.4 F

## 2024-02-12 DIAGNOSIS — R11.0 CHRONIC NAUSEA: ICD-10-CM

## 2024-02-12 DIAGNOSIS — F41.1 GENERALIZED ANXIETY DISORDER: ICD-10-CM

## 2024-02-12 DIAGNOSIS — Z00.00 ANNUAL PHYSICAL EXAM: Primary | ICD-10-CM

## 2024-02-12 PROCEDURE — 99395 PREV VISIT EST AGE 18-39: CPT | Performed by: FAMILY MEDICINE

## 2024-02-12 RX ORDER — SERTRALINE HYDROCHLORIDE 100 MG/1
150 TABLET, FILM COATED ORAL DAILY
Qty: 135 TABLET | Refills: 3 | Status: SHIPPED | OUTPATIENT
Start: 2024-02-12

## 2024-02-13 LAB
25(OH)D3+25(OH)D2 SERPL-MCNC: 36.6 NG/ML (ref 30–100)
ALBUMIN SERPL-MCNC: 4.6 G/DL (ref 4–5)
ALBUMIN/GLOB SERPL: 1.9 {RATIO} (ref 1.2–2.2)
ALP SERPL-CCNC: 99 IU/L (ref 44–121)
ALT SERPL-CCNC: 25 IU/L (ref 0–32)
AST SERPL-CCNC: 25 IU/L (ref 0–40)
BASOPHILS # BLD AUTO: 0 X10E3/UL (ref 0–0.2)
BASOPHILS NFR BLD AUTO: 0 %
BILIRUB SERPL-MCNC: 0.3 MG/DL (ref 0–1.2)
BUN SERPL-MCNC: 8 MG/DL (ref 6–20)
BUN/CREAT SERPL: 11 (ref 9–23)
CALCIUM SERPL-MCNC: 9.7 MG/DL (ref 8.7–10.2)
CHLORIDE SERPL-SCNC: 105 MMOL/L (ref 96–106)
CHOLEST SERPL-MCNC: 199 MG/DL (ref 100–199)
CHOLEST/HDLC SERPL: 2.2 RATIO (ref 0–4.4)
CO2 SERPL-SCNC: 23 MMOL/L (ref 20–29)
CREAT SERPL-MCNC: 0.76 MG/DL (ref 0.57–1)
EGFRCR SERPLBLD CKD-EPI 2021: 112 ML/MIN/1.73
EOSINOPHIL # BLD AUTO: 0.1 X10E3/UL (ref 0–0.4)
EOSINOPHIL NFR BLD AUTO: 1 %
ERYTHROCYTE [DISTWIDTH] IN BLOOD BY AUTOMATED COUNT: 11.9 % (ref 11.7–15.4)
GLOBULIN SER CALC-MCNC: 2.4 G/DL (ref 1.5–4.5)
GLUCOSE SERPL-MCNC: 93 MG/DL (ref 70–99)
HBA1C MFR BLD: 5.3 % (ref 4.8–5.6)
HCT VFR BLD AUTO: 42.5 % (ref 34–46.6)
HDLC SERPL-MCNC: 90 MG/DL
HGB BLD-MCNC: 14 G/DL (ref 11.1–15.9)
IMM GRANULOCYTES # BLD AUTO: 0 X10E3/UL (ref 0–0.1)
IMM GRANULOCYTES NFR BLD AUTO: 1 %
LDLC SERPL CALC-MCNC: 95 MG/DL (ref 0–99)
LYMPHOCYTES # BLD AUTO: 2.3 X10E3/UL (ref 0.7–3.1)
LYMPHOCYTES NFR BLD AUTO: 38 %
MCH RBC QN AUTO: 29.4 PG (ref 26.6–33)
MCHC RBC AUTO-ENTMCNC: 32.9 G/DL (ref 31.5–35.7)
MCV RBC AUTO: 89 FL (ref 79–97)
MONOCYTES # BLD AUTO: 0.4 X10E3/UL (ref 0.1–0.9)
MONOCYTES NFR BLD AUTO: 7 %
NEUTROPHILS # BLD AUTO: 3.2 X10E3/UL (ref 1.4–7)
NEUTROPHILS NFR BLD AUTO: 53 %
PLATELET # BLD AUTO: 248 X10E3/UL (ref 150–450)
POTASSIUM SERPL-SCNC: 4 MMOL/L (ref 3.5–5.2)
PROT SERPL-MCNC: 7 G/DL (ref 6–8.5)
RBC # BLD AUTO: 4.77 X10E6/UL (ref 3.77–5.28)
SODIUM SERPL-SCNC: 143 MMOL/L (ref 134–144)
TRIGL SERPL-MCNC: 81 MG/DL (ref 0–149)
TSH SERPL DL<=0.005 MIU/L-ACNC: 0.88 UIU/ML (ref 0.45–4.5)
VIT B12 SERPL-MCNC: 493 PG/ML (ref 232–1245)
VLDLC SERPL CALC-MCNC: 14 MG/DL (ref 5–40)
WBC # BLD AUTO: 6.1 X10E3/UL (ref 3.4–10.8)

## 2025-02-05 ENCOUNTER — TELEPHONE (OUTPATIENT)
Dept: INTERNAL MEDICINE | Facility: CLINIC | Age: 25
End: 2025-02-05
Payer: COMMERCIAL

## 2025-02-05 DIAGNOSIS — Z00.00 ROUTINE HEALTH MAINTENANCE: Primary | ICD-10-CM

## 2025-02-17 ENCOUNTER — OFFICE VISIT (OUTPATIENT)
Dept: INTERNAL MEDICINE | Facility: CLINIC | Age: 25
End: 2025-02-17
Payer: COMMERCIAL

## 2025-02-17 VITALS
DIASTOLIC BLOOD PRESSURE: 60 MMHG | SYSTOLIC BLOOD PRESSURE: 98 MMHG | TEMPERATURE: 98.6 F | WEIGHT: 158 LBS | HEART RATE: 85 BPM | OXYGEN SATURATION: 98 % | BODY MASS INDEX: 23.95 KG/M2 | HEIGHT: 68 IN

## 2025-02-17 DIAGNOSIS — F90.2 ATTENTION DEFICIT HYPERACTIVITY DISORDER (ADHD), COMBINED TYPE: ICD-10-CM

## 2025-02-17 DIAGNOSIS — Z00.00 ANNUAL PHYSICAL EXAM: Primary | ICD-10-CM

## 2025-02-17 DIAGNOSIS — Z23 NEED FOR VACCINATION: ICD-10-CM

## 2025-02-17 PROBLEM — Z20.7 SCABIES EXPOSURE: Status: RESOLVED | Noted: 2020-08-19 | Resolved: 2025-02-17

## 2025-02-17 PROBLEM — F90.0 ATTENTION DEFICIT HYPERACTIVITY DISORDER (ADHD), PREDOMINANTLY INATTENTIVE TYPE: Status: ACTIVE | Noted: 2025-02-17

## 2025-02-17 PROBLEM — M54.2 NECK PAIN: Status: RESOLVED | Noted: 2020-01-08 | Resolved: 2025-02-17

## 2025-02-17 PROCEDURE — 99395 PREV VISIT EST AGE 18-39: CPT | Performed by: FAMILY MEDICINE

## 2025-02-17 PROCEDURE — 90656 IIV3 VACC NO PRSV 0.5 ML IM: CPT | Performed by: FAMILY MEDICINE

## 2025-02-17 PROCEDURE — 90471 IMMUNIZATION ADMIN: CPT | Performed by: FAMILY MEDICINE

## 2025-02-17 RX ORDER — BUPROPION HYDROCHLORIDE 150 MG/1
150 TABLET ORAL DAILY
Qty: 30 TABLET | Refills: 5 | Status: SHIPPED | OUTPATIENT
Start: 2025-02-17

## 2025-02-17 NOTE — PROGRESS NOTES
Chief Complaint   Patient presents with    Annual Exam       Patient Name: Liza De Jesus is a 24 y.o. female presenting for Annual Exam      Well Adult Physical   Patient here for a comprehensive physical exam.The patient reports problems - She wants to address her ADHD, combined type.    Do you take any herbs or supplements that were not prescribed by a doctor? no   Are you taking calcium supplements? no   Are you taking aspirin daily? no     History:  Any STD's in the past? none  Liza Casillas 24 y.o. female who presents for an Annual Wellness Visit.  she has a history of   Patient Active Problem List   Diagnosis    Chronic nausea    Bilious vomiting with nausea    Gastroesophageal reflux disease with esophagitis without hemorrhage    Generalized anxiety disorder    Attention deficit hyperactivity disorder (ADHD), combined type   .  she has been doing well with new interval problems.      Health Habits:  Dental Exam. up to date  Eye Exam. up to date  Exercise: 3 times/week.  Current exercise activities include: pilates      The following portions of the patient's history were reviewed and updated as appropriate: allergies, current medications, past family history, past medical history, past social history, past surgical history and problem list.    Review of Systems   Constitutional: Negative.    HENT: Negative.     Eyes: Negative.    Respiratory: Negative.     Cardiovascular: Negative.    Gastrointestinal:  Positive for nausea.   Endocrine: Negative.    Genitourinary: Negative.    Musculoskeletal: Negative.    Skin: Negative.    Allergic/Immunologic: Negative.    Neurological: Negative.    Hematological: Negative.    Psychiatric/Behavioral: Negative.         Sulfa antibiotics      Current Outpatient Medications:     calcium carbonate (TUMS) 500 MG chewable tablet, Chew 1 tablet Daily., Disp: , Rfl:     norgestimate-ethinyl estradiol (ORTHO TRI-CYCLEN,TRINESSA) 0.18/0.215/0.25 MG-35 MCG  "per tablet, Take  by mouth., Disp: , Rfl:     ondansetron (Zofran) 4 MG tablet, Take 1 tablet by mouth Every 12 (Twelve) Hours As Needed for Nausea or Vomiting., Disp: 60 tablet, Rfl: 3    Probiotic Product (PROBIOTIC PO), Take  by mouth., Disp: , Rfl:     sertraline (Zoloft) 100 MG tablet, Take 1.5 tablets by mouth Daily., Disp: 135 tablet, Rfl: 3    buPROPion XL (Wellbutrin XL) 150 MG 24 hr tablet, Take 1 tablet by mouth Daily., Disp: 30 tablet, Rfl: 5    OBJECTIVE    BP 98/60 (BP Location: Left arm, Patient Position: Sitting, Cuff Size: Adult)   Pulse 85   Temp 98.6 °F (37 °C) (Infrared)   Ht 172.7 cm (68\")   Wt 71.7 kg (158 lb)   SpO2 98%   BMI 24.02 kg/m²     @BMI is within normal parameters. No other follow-up for BMI required.       Physical Exam  Vitals and nursing note reviewed.   Constitutional:       Appearance: Normal appearance. She is well-developed.   HENT:      Head: Normocephalic and atraumatic.      Right Ear: Tympanic membrane and external ear normal.      Left Ear: Tympanic membrane and external ear normal.      Nose: Nose normal.      Mouth/Throat:      Mouth: Mucous membranes are moist.      Pharynx: Oropharynx is clear.   Eyes:      General: No scleral icterus.        Right eye: No discharge.         Left eye: No discharge.      Extraocular Movements: Extraocular movements intact.      Conjunctiva/sclera: Conjunctivae normal.      Pupils: Pupils are equal, round, and reactive to light.   Neck:      Thyroid: No thyromegaly.      Vascular: No carotid bruit.   Cardiovascular:      Rate and Rhythm: Normal rate and regular rhythm.      Heart sounds: Normal heart sounds. No murmur heard.     No friction rub. No gallop.   Pulmonary:      Effort: Pulmonary effort is normal. No respiratory distress.      Breath sounds: Normal breath sounds. No wheezing or rales.   Abdominal:      General: Bowel sounds are normal.      Palpations: Abdomen is soft. There is no mass.      Tenderness: There is no " abdominal tenderness.      Hernia: No hernia is present.   Musculoskeletal:         General: No deformity.      Cervical back: Neck supple. No rigidity.      Right lower leg: No edema.      Left lower leg: No edema.   Lymphadenopathy:      Cervical: No cervical adenopathy.   Skin:     General: Skin is warm and dry.      Findings: No rash.   Neurological:      General: No focal deficit present.      Mental Status: She is alert and oriented to person, place, and time.      Cranial Nerves: No cranial nerve deficit.      Motor: No abnormal muscle tone.      Coordination: Coordination normal.      Deep Tendon Reflexes: Reflexes are normal and symmetric. Reflexes normal.   Psychiatric:         Mood and Affect: Mood normal.         Behavior: Behavior normal.         Thought Content: Thought content normal.         Judgment: Judgment normal.         Common labs          2/10/2025    08:31   Common Labs   Glucose 106    BUN 10    Creatinine 0.92    Sodium 140    Potassium 4.1    Chloride 102    Calcium 9.2    Albumin 4.3    Total Bilirubin 0.3    Alkaline Phosphatase 90    AST (SGOT) 18    ALT (SGPT) 20    WBC 5.91    Hemoglobin 14.4    Hematocrit 45.0    Platelets 254    Total Cholesterol 218    Triglycerides 76    HDL Cholesterol 94    LDL Cholesterol  111    Hemoglobin A1C 5.00         ASSESSMENT AND PLAN  Diagnoses and all orders for this visit:    1. Annual physical exam (Primary)    2. Need for vaccination  -     Fluzone >6mos    3. Attention deficit hyperactivity disorder (ADHD), combined type  -     buPROPion XL (Wellbutrin XL) 150 MG 24 hr tablet; Take 1 tablet by mouth Daily.  Dispense: 30 tablet; Refill: 5      She sees GIO Kaur at [facility].  Tdap UTD.  Flu vaccine done at pharmacy.  Labs reviewed.     ED.  Controlled with sertraline 150 mg daily.  She has done therapy in the past.       Chronic nausea. Controlled with dietary measures and occasional antinausea medication.  Treating the anxiety  improved this as well.  She saw GI and had EGD 8/2020.    ADHD.  Obtain records from [place].  Options discussed.  Bupropion started.    Discussed healthy diet, exercise, cancer screening, immunizations, and preventive care.  Hm tab updated.  Encouraged seat belt use.  No texting while driving. Orders placed as below.     Encouraged covid vaccination if not already done.  Covid vaccination can be scheduled at [email or web address]    continue current medications, continue current healthy lifestyle patterns, and return for routine annual checkups      Return in about 4 weeks (around 3/17/2025) for Annual physical.

## 2025-02-24 DIAGNOSIS — F41.1 GENERALIZED ANXIETY DISORDER: ICD-10-CM

## 2025-02-24 RX ORDER — SERTRALINE HYDROCHLORIDE 100 MG/1
150 TABLET, FILM COATED ORAL DAILY
Qty: 135 TABLET | Refills: 3 | Status: SHIPPED | OUTPATIENT
Start: 2025-02-24

## 2025-02-24 NOTE — TELEPHONE ENCOUNTER
Rx Refill Note  Requested Prescriptions     Pending Prescriptions Disp Refills    sertraline (Zoloft) 100 MG tablet 135 tablet 3     Sig: Take 1.5 tablets by mouth Daily.      Last office visit with prescribing clinician: 2/17/2025   Last telemedicine visit with prescribing clinician: Visit date not found   Next office visit with prescribing clinician: 4/14/2025                         Would you like a call back once the refill request has been completed: [] Yes [] No    If the office needs to give you a call back, can they leave a voicemail: [] Yes [] No    Mariaelena Pichardo MA  02/24/25, 08:27 EST

## 2025-02-24 NOTE — TELEPHONE ENCOUNTER
Caller: Liza Casillas    Relationship: Self    Best call back number: [phone]     Requested Prescriptions:   Requested Prescriptions     Pending Prescriptions Disp Refills    sertraline (Zoloft) 100 MG tablet 135 tablet 3     Sig: Take 1.5 tablets by mouth Daily.        Pharmacy where request should be sent: [facility] [phone] [place] [id number]  [facility]  [phone] [facility] [phone] FX     Last office visit with prescribing clinician: 2/17/2025   Last telemedicine visit with prescribing clinician: Visit date not found   Next office visit with prescribing clinician: 4/14/2025     Additional details provided by patient:     Does the patient have less than a 3 day supply:  [x] Yes  [] No    Would you like a call back once the refill request has been completed: [] Yes [] No    If the office needs to give you a call back, can they leave a voicemail: [] Yes [] No    Johny Boothe Rep   02/24/25 08:24 EST

## 2025-04-14 ENCOUNTER — OFFICE VISIT (OUTPATIENT)
Dept: INTERNAL MEDICINE | Facility: CLINIC | Age: 25
End: 2025-04-14
Payer: COMMERCIAL

## 2025-04-14 VITALS
WEIGHT: 153 LBS | BODY MASS INDEX: 23.19 KG/M2 | HEART RATE: 84 BPM | HEIGHT: 68 IN | TEMPERATURE: 98 F | DIASTOLIC BLOOD PRESSURE: 70 MMHG | OXYGEN SATURATION: 99 % | SYSTOLIC BLOOD PRESSURE: 122 MMHG

## 2025-04-14 DIAGNOSIS — F90.2 ATTENTION DEFICIT HYPERACTIVITY DISORDER (ADHD), COMBINED TYPE: Primary | ICD-10-CM

## 2025-04-14 PROCEDURE — 99213 OFFICE O/P EST LOW 20 MIN: CPT | Performed by: FAMILY MEDICINE

## 2025-04-14 RX ORDER — BUPROPION HYDROCHLORIDE 300 MG/1
300 TABLET ORAL DAILY
Qty: 30 TABLET | Refills: 5 | Status: SHIPPED | OUTPATIENT
Start: 2025-04-14

## 2025-04-14 NOTE — PROGRESS NOTES
Subjective   Liza Casillas is a 24 y.o. female presenting today for follow up of   Chief Complaint   Patient presents with    ADHD       History of Present Illness     Pt here for 8 week f/u on ADHD.  She brought in the report from her neuropsychological testing done at [facility] 10/1023.  This report does support the diagnosis of ADHD, anxiety, and possible cyclothymic disorder.  She started bupropion XL last time.  She says she doesn't know yet if it is helping.  She would like to give it some more time.  She has been feeling a little down in terms of her mood; denies SI.  She is tolerating the bupropion but thinks the appetite may be a little decreased.    Patient Active Problem List   Diagnosis    Chronic nausea    Bilious vomiting with nausea    Gastroesophageal reflux disease with esophagitis without hemorrhage    Generalized anxiety disorder    Attention deficit hyperactivity disorder (ADHD), combined type       Current Outpatient Medications on File Prior to Visit   Medication Sig    calcium carbonate (TUMS) 500 MG chewable tablet Chew 1 tablet Daily.    norgestimate-ethinyl estradiol (ORTHO TRI-CYCLEN,TRINESSA) 0.18/0.215/0.25 MG-35 MCG per tablet Take  by mouth.    ondansetron (Zofran) 4 MG tablet Take 1 tablet by mouth Every 12 (Twelve) Hours As Needed for Nausea or Vomiting.    Probiotic Product (PROBIOTIC PO) Take  by mouth.    sertraline (Zoloft) 100 MG tablet Take 1.5 tablets by mouth Daily.    [DISCONTINUED] buPROPion XL (Wellbutrin XL) 150 MG 24 hr tablet Take 1 tablet by mouth Daily.     No current facility-administered medications on file prior to visit.          The following portions of the patient's history were reviewed and updated as appropriate: allergies, current medications, past family history, past medical history, past social history, past surgical history and problem list.    Review of Systems   Constitutional: Negative.    Psychiatric/Behavioral:  Positive for decreased  "concentration and dysphoric mood. Negative for self-injury and suicidal ideas. The patient is not nervous/anxious.        Objective   Vitals:    04/14/25 0907   BP: 122/70   BP Location: Left arm   Patient Position: Sitting   Cuff Size: Adult   Pulse: 84   Temp: 98 °F (36.7 °C)   TempSrc: Infrared   SpO2: 99%   Weight: 69.4 kg (153 lb)   Height: 172.7 cm (68\")       BP Readings from Last 3 Encounters:   04/14/25 122/70   02/17/25 98/60   02/12/24 104/68        Wt Readings from Last 3 Encounters:   04/14/25 69.4 kg (153 lb)   02/17/25 71.7 kg (158 lb)   02/12/24 67.4 kg (148 lb 9.6 oz)        Body mass index is 23.26 kg/m².  Nursing notes and vitals reviewed.    Physical Exam  Vitals and nursing note reviewed.   Constitutional:       Appearance: Normal appearance.   HENT:      Head: Normocephalic and atraumatic.      Mouth/Throat:      Mouth: Mucous membranes are moist.   Eyes:      Extraocular Movements: Extraocular movements intact.      Conjunctiva/sclera: Conjunctivae normal.   Pulmonary:      Effort: Pulmonary effort is normal. No respiratory distress.   Musculoskeletal:      Cervical back: Neck supple. No rigidity.   Skin:     General: Skin is warm and dry.   Neurological:      General: No focal deficit present.      Mental Status: She is alert and oriented to person, place, and time.   Psychiatric:         Mood and Affect: Mood normal.         Behavior: Behavior normal.         No results found for this or any previous visit (from the past 4 weeks).      Assessment & Plan   Diagnoses and all orders for this visit:    1. Attention deficit hyperactivity disorder (ADHD), combined type (Primary)  -     buPROPion XL (Wellbutrin XL) 300 MG 24 hr tablet; Take 1 tablet by mouth Daily.  Dispense: 30 tablet; Refill: 5      Neuropsychological evaluation report reviewed.  Options discussed.  We'll increase the bupropion XL from 150 mg to 300 mg daily.  Anticipatory guidance given.  F/U 2 months.        Medications, " including side effects, were discussed with the patient. Patient verbalized understanding.  The plan of care was discussed. All questions were answered. Patient verbalized understanding.      Return in about 2 months (around 6/14/2025).

## (undated) DEVICE — SPNG GZ WOVN 4X4IN 12PLY 10/BX STRL

## (undated) DEVICE — VIAL FORMALIN CAP 10P 40ML

## (undated) DEVICE — Device

## (undated) DEVICE — THE BITE BLOCK MAXI, LATEX FREE STRAP IS USED TO PROTECT THE ENDOSCOPE INSERTION TUBE FROM BEING BITTEN BY THE PATIENT.

## (undated) DEVICE — SYR LL 3CC

## (undated) DEVICE — JACKT LAB F/R KNIT CUFF/COLR XLG BLU

## (undated) DEVICE — GLV SURG SENSICARE PI MIC PF SZ7.5 LF STRL

## (undated) DEVICE — KT ORCA ORCAPOD DISP STRL

## (undated) DEVICE — BW-412T DISP COMBO CLEANING BRUSH: Brand: SINGLE USE COMBINATION CLEANING BRUSH

## (undated) DEVICE — FRCP BX RADJAW4 NDL 2.8 240CM LG OG BX40

## (undated) DEVICE — SUCTION CANISTER, 3000CC,SAFELINER: Brand: DEROYAL